# Patient Record
Sex: MALE | Race: WHITE | HISPANIC OR LATINO | Employment: FULL TIME | URBAN - METROPOLITAN AREA
[De-identification: names, ages, dates, MRNs, and addresses within clinical notes are randomized per-mention and may not be internally consistent; named-entity substitution may affect disease eponyms.]

---

## 2023-09-12 ENCOUNTER — LAB (OUTPATIENT)
Dept: LAB | Facility: LAB | Age: 53
End: 2023-09-12
Payer: OTHER GOVERNMENT

## 2023-09-12 LAB
ACTIVATED PARTIAL THROMBOPLASTIN TIME IN PPP BY COAGULATION ASSAY: 30 SEC (ref 27–38)
ALANINE AMINOTRANSFERASE (SGPT) (U/L) IN SER/PLAS: NORMAL
ALBUMIN (G/DL) IN SER/PLAS: NORMAL
ALKALINE PHOSPHATASE (U/L) IN SER/PLAS: NORMAL
AMYLASE (U/L) IN SER/PLAS: NORMAL
APPEARANCE, URINE: CLEAR
ASPARTATE AMINOTRANSFERASE (SGOT) (U/L) IN SER/PLAS: NORMAL
BILIRUBIN DIRECT (MG/DL) IN SER/PLAS: NORMAL
BILIRUBIN TOTAL (MG/DL) IN SER/PLAS: NORMAL
BILIRUBIN, URINE: NEGATIVE
BLOOD, URINE: ABNORMAL
C PEPTIDE (NG/ML) IN SER/PLAS: 3.7 NG/ML (ref 0.7–3.9)
COLOR, URINE: ABNORMAL
CREATININE (MG/DL) IN SER/PLAS: NORMAL
CYTOMEGALOVIRUS IGG ANTIBODY: REACTIVE
EBV INTERPRETATION: ABNORMAL
EPSTEIN-BARR VCA IGG: POSITIVE
EPSTEIN-BARR VCA IGM: NEGATIVE
EPSTEIN-BARR VIRUS EARLY ANTIGEN ANTIBODY, IGG: POSITIVE
EPSTIEN-BARR NUCLEAR ANTIGEN AB: POSITIVE
ERYTHROCYTE DISTRIBUTION WIDTH (RATIO) BY AUTOMATED COUNT: 13.2 % (ref 11.5–14.5)
ERYTHROCYTE MEAN CORPUSCULAR HEMOGLOBIN CONCENTRATION (G/DL) BY AUTOMATED: 34.9 G/DL (ref 32–36)
ERYTHROCYTE MEAN CORPUSCULAR VOLUME (FL) BY AUTOMATED COUNT: 93 FL (ref 80–100)
ERYTHROCYTES (10*6/UL) IN BLOOD BY AUTOMATED COUNT: 4.93 X10E12/L (ref 4.5–5.9)
ESTIMATED AVERAGE GLUCOSE FOR HBA1C: 214 MG/DL
FLOW AUTOCROSSMATCH: NORMAL
GFR FEMALE: NORMAL
GFR MALE: NORMAL
GLUCOSE, URINE: ABNORMAL MG/DL
HEMATOCRIT (%) IN BLOOD BY AUTOMATED COUNT: 45.9 % (ref 41–52)
HEMOGLOBIN (G/DL) IN BLOOD: 16 G/DL (ref 13.5–17.5)
HEMOGLOBIN A1C/HEMOGLOBIN TOTAL IN BLOOD: 9.1 %
HEPATITIS B VIRUS CORE AB (PRESENCE) IN SER/PLAS BY IMM: NONREACTIVE
HEPATITIS B VIRUS SURFACE AB (MIU/ML) IN SERUM: 86.5 MIU/ML
HEPATITIS B VIRUS SURFACE AG PRESENCE IN SERUM: NONREACTIVE
HEPATITIS C VIRUS AB PRESENCE IN SERUM: NONREACTIVE
HIV 1/ 2 AG/AB SCREEN: NONREACTIVE
HLA CLASS I ANTIBODY SCREEN, FLOW CYTOMETRY: NORMAL
HLA-A LOCUS LOW RESOLUTION TYPING: NORMAL
HLA-B LOCUS LOW RESOLUTION TYPING: NORMAL
HLA-C LOCUS LOW RESOLUTION TYPING: NORMAL
HLA-DPB1 HIGH RESOLUTION TYPING: NORMAL
HLA-DQB1 HIGH RESOLUTION TYPING: NORMAL
HLA-DR1/3/4/5 + DQB1 LOW RES TYPING: NORMAL
INR IN PPP BY COAGULATION ASSAY: 0.9 (ref 0.9–1.1)
KETONES, URINE: NEGATIVE MG/DL
LEUKOCYTE ESTERASE, URINE: NEGATIVE
LEUKOCYTES (10*3/UL) IN BLOOD BY AUTOMATED COUNT: 10 X10E9/L (ref 4.4–11.3)
NITRITE, URINE: NEGATIVE
NRBC (PER 100 WBCS) BY AUTOMATED COUNT: 0 /100 WBC (ref 0–0)
PH, URINE: 6 (ref 5–8)
PHOSPHATE (MG/DL) IN SER/PLAS: NORMAL
PLATELETS (10*3/UL) IN BLOOD AUTOMATED COUNT: 250 X10E9/L (ref 150–450)
PROSTATE SPECIFIC AG (NG/ML) IN SER/PLAS: 0.38 NG/ML (ref 0–4)
PROTEIN TOTAL: NORMAL
PROTEIN, URINE: ABNORMAL MG/DL
PROTHROMBIN TIME (PT) IN PPP BY COAGULATION ASSAY: 10.2 SEC (ref 9.8–12.8)
RBC, URINE: NORMAL /HPF (ref 0–5)
SPECIFIC GRAVITY, URINE: 1.02 (ref 1–1.03)
SYPHILIS TOTAL AB: NONREACTIVE
UREA NITROGEN (MG/DL) IN SER/PLAS: NORMAL
UROBILINOGEN, URINE: <2 MG/DL (ref 0–1.9)
VARICELLA ZOSTER IGG: POSITIVE
WBC, URINE: NORMAL /HPF (ref 0–5)

## 2023-09-14 LAB
ABO GROUP (TYPE) IN BLOOD: NORMAL
NIL(NEG) CONTROL SPOT COUNT: NORMAL
PANEL A SPOT COUNT: 0
PANEL B SPOT COUNT: 0
POS CONTROL SPOT COUNT: NORMAL
RH FACTOR: NORMAL
T-SPOT. TB INTERPRETATION: NEGATIVE

## 2023-09-15 LAB
AMPHETAMINE SCREEN BLOOD: NEGATIVE NG/ML
BARBITURATE SCREEN BLOOD: NEGATIVE NG/ML
BENZODIAZEPINES SCREEN BLOOD: NEGATIVE NG/ML
BUPRENORPHINE SCREEN BLOOD: NEGATIVE NG/ML
CANNABINOIDS SCREEN BLOOD: NEGATIVE NG/ML
COCAINE SCREEN BLOOD: NEGATIVE NG/ML
DRUG SCREEN COMMENT BLOOD: NORMAL
METHADONE SCREEN BLOOD: NEGATIVE NG/ML
METHAMPHETAMINE, BLOOD, SCREEN: NEGATIVE NG/ML
OPIATE SCREEN BLOOD: NEGATIVE NG/ML
OXYCODONE SCREEN BLOOD: NEGATIVE NG/ML
PHENCYCLIDINE SCREEN BLOOD: NEGATIVE NG/ML

## 2023-09-18 LAB
COTININE BLOOD QUANTITATIVE: <5 NG/ML
NICOTINE BLOOD QUANTITATIVE: <5 NG/ML

## 2023-11-30 ENCOUNTER — LAB REQUISITION (OUTPATIENT)
Dept: LAB | Facility: CLINIC | Age: 53
End: 2023-11-30
Payer: OTHER GOVERNMENT

## 2023-11-30 DIAGNOSIS — N18.6 END STAGE RENAL DISEASE (MULTI): ICD-10-CM

## 2023-11-30 PROCEDURE — 80504 PATH CLIN CONSLTJ MOD 21-40: CPT | Performed by: INTERNAL MEDICINE

## 2023-12-04 ENCOUNTER — LAB REQUISITION (OUTPATIENT)
Dept: LAB | Facility: CLINIC | Age: 53
End: 2023-12-04

## 2023-12-04 DIAGNOSIS — N18.6 END STAGE RENAL DISEASE (MULTI): ICD-10-CM

## 2023-12-04 LAB
HLA CLS I TYP PNL BLD/T DONR HIGH RES: NORMAL
HLA RESULTS: NORMAL
HLA-DP2 QL: NORMAL
HLA-DQB1 HIGH RES: NORMAL
HLA-DRB1 HIGH RES: NORMAL

## 2023-12-16 PROCEDURE — 80504 PATH CLIN CONSLTJ MOD 21-40: CPT | Performed by: TRANSPLANT SURGERY

## 2023-12-19 ENCOUNTER — LAB REQUISITION (OUTPATIENT)
Dept: LAB | Facility: CLINIC | Age: 53
End: 2023-12-19

## 2023-12-19 DIAGNOSIS — N18.6 END STAGE RENAL DISEASE (MULTI): ICD-10-CM

## 2023-12-27 ENCOUNTER — LAB REQUISITION (OUTPATIENT)
Dept: LAB | Facility: CLINIC | Age: 53
End: 2023-12-27
Payer: OTHER GOVERNMENT

## 2023-12-27 DIAGNOSIS — N18.6 END STAGE RENAL DISEASE (MULTI): ICD-10-CM

## 2024-01-10 ENCOUNTER — APPOINTMENT (OUTPATIENT)
Dept: CARDIOLOGY | Facility: HOSPITAL | Age: 54
DRG: 391 | End: 2024-01-10
Payer: OTHER GOVERNMENT

## 2024-01-10 ENCOUNTER — APPOINTMENT (OUTPATIENT)
Dept: RADIOLOGY | Facility: HOSPITAL | Age: 54
DRG: 391 | End: 2024-01-10
Payer: OTHER GOVERNMENT

## 2024-01-10 ENCOUNTER — HOSPITAL ENCOUNTER (INPATIENT)
Facility: HOSPITAL | Age: 54
LOS: 1 days | Discharge: HOME | DRG: 391 | End: 2024-01-11
Attending: EMERGENCY MEDICINE | Admitting: STUDENT IN AN ORGANIZED HEALTH CARE EDUCATION/TRAINING PROGRAM
Payer: OTHER GOVERNMENT

## 2024-01-10 DIAGNOSIS — N18.6 ESRD ON DIALYSIS (MULTI): Primary | ICD-10-CM

## 2024-01-10 DIAGNOSIS — R10.10 PAIN OF UPPER ABDOMEN: ICD-10-CM

## 2024-01-10 DIAGNOSIS — R11.2 NAUSEA AND VOMITING, UNSPECIFIED VOMITING TYPE: ICD-10-CM

## 2024-01-10 DIAGNOSIS — Z99.2 ESRD ON DIALYSIS (MULTI): Primary | ICD-10-CM

## 2024-01-10 PROBLEM — I10 PRIMARY HYPERTENSION: Status: ACTIVE | Noted: 2024-01-10

## 2024-01-10 PROBLEM — E78.49 OTHER HYPERLIPIDEMIA: Status: ACTIVE | Noted: 2024-01-10

## 2024-01-10 PROBLEM — E11.8 CONTROLLED TYPE 2 DIABETES MELLITUS WITH COMPLICATION, WITH LONG-TERM CURRENT USE OF INSULIN (MULTI): Status: ACTIVE | Noted: 2024-01-10

## 2024-01-10 PROBLEM — Z79.4 CONTROLLED TYPE 2 DIABETES MELLITUS WITH COMPLICATION, WITH LONG-TERM CURRENT USE OF INSULIN (MULTI): Status: ACTIVE | Noted: 2024-01-10

## 2024-01-10 LAB
ALBUMIN SERPL BCP-MCNC: 4.3 G/DL (ref 3.4–5)
ALP SERPL-CCNC: 74 U/L (ref 33–120)
ALT SERPL W P-5'-P-CCNC: 20 U/L (ref 10–52)
AMPHETAMINES UR QL SCN: NORMAL
ANION GAP SERPL CALC-SCNC: 20 MMOL/L (ref 10–20)
APPEARANCE UR: CLEAR
AST SERPL W P-5'-P-CCNC: 30 U/L (ref 9–39)
BARBITURATES UR QL SCN: NORMAL
BENZODIAZ UR QL SCN: NORMAL
BILIRUB SERPL-MCNC: 0.6 MG/DL (ref 0–1.2)
BILIRUB UR STRIP.AUTO-MCNC: NEGATIVE MG/DL
BUN SERPL-MCNC: 77 MG/DL (ref 6–23)
BZE UR QL SCN: NORMAL
CALCIUM SERPL-MCNC: 9.2 MG/DL (ref 8.6–10.3)
CANNABINOIDS UR QL SCN: NORMAL
CARDIAC TROPONIN I PNL SERPL HS: 10 NG/L (ref 0–20)
CHLORIDE SERPL-SCNC: 100 MMOL/L (ref 98–107)
CO2 SERPL-SCNC: 23 MMOL/L (ref 21–32)
COLOR UR: YELLOW
CREAT SERPL-MCNC: 9.6 MG/DL (ref 0.5–1.3)
EGFRCR SERPLBLD CKD-EPI 2021: 6 ML/MIN/1.73M*2
ERYTHROCYTE [DISTWIDTH] IN BLOOD BY AUTOMATED COUNT: 12.8 % (ref 11.5–14.5)
FENTANYL+NORFENTANYL UR QL SCN: NORMAL
GLUCOSE BLD MANUAL STRIP-MCNC: 149 MG/DL (ref 74–99)
GLUCOSE SERPL-MCNC: 164 MG/DL (ref 74–99)
GLUCOSE UR STRIP.AUTO-MCNC: ABNORMAL MG/DL
HCT VFR BLD AUTO: 38.1 % (ref 41–52)
HGB BLD-MCNC: 12.7 G/DL (ref 13.5–17.5)
HOLD SPECIMEN: NORMAL
KETONES UR STRIP.AUTO-MCNC: ABNORMAL MG/DL
LACTATE SERPL-SCNC: 2.3 MMOL/L (ref 0.4–2)
LACTATE SERPL-SCNC: 2.8 MMOL/L (ref 0.4–2)
LEUKOCYTE ESTERASE UR QL STRIP.AUTO: NEGATIVE
LIPASE SERPL-CCNC: 49 U/L (ref 9–82)
MCH RBC QN AUTO: 30.6 PG (ref 26–34)
MCHC RBC AUTO-ENTMCNC: 33.3 G/DL (ref 32–36)
MCV RBC AUTO: 92 FL (ref 80–100)
NITRITE UR QL STRIP.AUTO: NEGATIVE
NRBC BLD-RTO: 0 /100 WBCS (ref 0–0)
OPIATES UR QL SCN: NORMAL
OXYCODONE+OXYMORPHONE UR QL SCN: NORMAL
PCP UR QL SCN: NORMAL
PH UR STRIP.AUTO: 8 [PH]
PLATELET # BLD AUTO: 245 X10*3/UL (ref 150–450)
POTASSIUM SERPL-SCNC: 5.2 MMOL/L (ref 3.5–5.3)
PROT SERPL-MCNC: 7.2 G/DL (ref 6.4–8.2)
PROT UR STRIP.AUTO-MCNC: ABNORMAL MG/DL
RBC # BLD AUTO: 4.15 X10*6/UL (ref 4.5–5.9)
RBC # UR STRIP.AUTO: NEGATIVE /UL
RBC #/AREA URNS AUTO: NORMAL /HPF
SODIUM SERPL-SCNC: 138 MMOL/L (ref 136–145)
SP GR UR STRIP.AUTO: 1.01
UROBILINOGEN UR STRIP.AUTO-MCNC: <2 MG/DL
WBC # BLD AUTO: 13.3 X10*3/UL (ref 4.4–11.3)
WBC #/AREA URNS AUTO: NORMAL /HPF

## 2024-01-10 PROCEDURE — 99285 EMERGENCY DEPT VISIT HI MDM: CPT | Performed by: EMERGENCY MEDICINE

## 2024-01-10 PROCEDURE — 36415 COLL VENOUS BLD VENIPUNCTURE: CPT | Performed by: STUDENT IN AN ORGANIZED HEALTH CARE EDUCATION/TRAINING PROGRAM

## 2024-01-10 PROCEDURE — 83605 ASSAY OF LACTIC ACID: CPT | Performed by: STUDENT IN AN ORGANIZED HEALTH CARE EDUCATION/TRAINING PROGRAM

## 2024-01-10 PROCEDURE — 80307 DRUG TEST PRSMV CHEM ANLYZR: CPT | Performed by: STUDENT IN AN ORGANIZED HEALTH CARE EDUCATION/TRAINING PROGRAM

## 2024-01-10 PROCEDURE — 96376 TX/PRO/DX INJ SAME DRUG ADON: CPT

## 2024-01-10 PROCEDURE — 2500000004 HC RX 250 GENERAL PHARMACY W/ HCPCS (ALT 636 FOR OP/ED): Performed by: INTERNAL MEDICINE

## 2024-01-10 PROCEDURE — 2500000004 HC RX 250 GENERAL PHARMACY W/ HCPCS (ALT 636 FOR OP/ED): Performed by: STUDENT IN AN ORGANIZED HEALTH CARE EDUCATION/TRAINING PROGRAM

## 2024-01-10 PROCEDURE — 82947 ASSAY GLUCOSE BLOOD QUANT: CPT

## 2024-01-10 PROCEDURE — 83690 ASSAY OF LIPASE: CPT | Performed by: STUDENT IN AN ORGANIZED HEALTH CARE EDUCATION/TRAINING PROGRAM

## 2024-01-10 PROCEDURE — 99222 1ST HOSP IP/OBS MODERATE 55: CPT | Performed by: STUDENT IN AN ORGANIZED HEALTH CARE EDUCATION/TRAINING PROGRAM

## 2024-01-10 PROCEDURE — 84484 ASSAY OF TROPONIN QUANT: CPT | Performed by: STUDENT IN AN ORGANIZED HEALTH CARE EDUCATION/TRAINING PROGRAM

## 2024-01-10 PROCEDURE — 74177 CT ABD & PELVIS W/CONTRAST: CPT

## 2024-01-10 PROCEDURE — 93010 ELECTROCARDIOGRAM REPORT: CPT | Performed by: INTERNAL MEDICINE

## 2024-01-10 PROCEDURE — 71045 X-RAY EXAM CHEST 1 VIEW: CPT | Performed by: RADIOLOGY

## 2024-01-10 PROCEDURE — 84075 ASSAY ALKALINE PHOSPHATASE: CPT | Performed by: STUDENT IN AN ORGANIZED HEALTH CARE EDUCATION/TRAINING PROGRAM

## 2024-01-10 PROCEDURE — 94760 N-INVAS EAR/PLS OXIMETRY 1: CPT

## 2024-01-10 PROCEDURE — 71045 X-RAY EXAM CHEST 1 VIEW: CPT

## 2024-01-10 PROCEDURE — 85027 COMPLETE CBC AUTOMATED: CPT | Performed by: STUDENT IN AN ORGANIZED HEALTH CARE EDUCATION/TRAINING PROGRAM

## 2024-01-10 PROCEDURE — 96374 THER/PROPH/DIAG INJ IV PUSH: CPT

## 2024-01-10 PROCEDURE — 2550000001 HC RX 255 CONTRASTS: Performed by: EMERGENCY MEDICINE

## 2024-01-10 PROCEDURE — 74177 CT ABD & PELVIS W/CONTRAST: CPT | Performed by: RADIOLOGY

## 2024-01-10 PROCEDURE — 2500000004 HC RX 250 GENERAL PHARMACY W/ HCPCS (ALT 636 FOR OP/ED)

## 2024-01-10 PROCEDURE — 96375 TX/PRO/DX INJ NEW DRUG ADDON: CPT

## 2024-01-10 PROCEDURE — 1200000002 HC GENERAL ROOM WITH TELEMETRY DAILY

## 2024-01-10 PROCEDURE — 93005 ELECTROCARDIOGRAM TRACING: CPT

## 2024-01-10 PROCEDURE — 81001 URINALYSIS AUTO W/SCOPE: CPT

## 2024-01-10 PROCEDURE — 93010 ELECTROCARDIOGRAM REPORT: CPT | Performed by: EMERGENCY MEDICINE

## 2024-01-10 RX ORDER — AMLODIPINE BESYLATE 10 MG/1
10 TABLET ORAL
COMMUNITY

## 2024-01-10 RX ORDER — LORAZEPAM 0.5 MG/1
0.5 TABLET ORAL NIGHTLY PRN
COMMUNITY

## 2024-01-10 RX ORDER — ONDANSETRON HYDROCHLORIDE 2 MG/ML
4 INJECTION, SOLUTION INTRAVENOUS ONCE
Status: COMPLETED | OUTPATIENT
Start: 2024-01-10 | End: 2024-01-10

## 2024-01-10 RX ORDER — INSULIN GLARGINE-YFGN 100 [IU]/ML
35 INJECTION, SOLUTION SUBCUTANEOUS NIGHTLY
COMMUNITY
Start: 2023-08-04

## 2024-01-10 RX ORDER — INSULIN GLARGINE 100 [IU]/ML
20 INJECTION, SOLUTION SUBCUTANEOUS
COMMUNITY
End: 2024-01-10 | Stop reason: ENTERED-IN-ERROR

## 2024-01-10 RX ORDER — FOLIC ACID/VIT B COMPLEX AND C 0.8 MG
0.8 TABLET ORAL DAILY
COMMUNITY

## 2024-01-10 RX ORDER — DEXLANSOPRAZOLE 60 MG/1
CAPSULE, DELAYED RELEASE ORAL
COMMUNITY
Start: 2012-05-14 | End: 2024-01-10 | Stop reason: ENTERED-IN-ERROR

## 2024-01-10 RX ORDER — HEPARIN SODIUM 5000 [USP'U]/ML
3000 INJECTION, SOLUTION INTRAVENOUS; SUBCUTANEOUS ONCE
Status: DISCONTINUED | OUTPATIENT
Start: 2024-01-10 | End: 2024-01-11 | Stop reason: HOSPADM

## 2024-01-10 RX ORDER — POLYETHYLENE GLYCOL 3350 17 G/17G
17 POWDER, FOR SOLUTION ORAL DAILY
COMMUNITY

## 2024-01-10 RX ORDER — INSULIN ASPART 100 [IU]/ML
1-5 INJECTION, SOLUTION INTRAVENOUS; SUBCUTANEOUS
COMMUNITY
Start: 2023-08-07 | End: 2024-08-07

## 2024-01-10 RX ORDER — TALC
6 POWDER (GRAM) TOPICAL NIGHTLY
COMMUNITY

## 2024-01-10 RX ORDER — FUROSEMIDE 40 MG/1
40 TABLET ORAL DAILY
COMMUNITY

## 2024-01-10 RX ORDER — SUCROFERRIC OXYHYDROXIDE 500 MG/1
1000 TABLET, CHEWABLE ORAL
COMMUNITY

## 2024-01-10 RX ORDER — CETIRIZINE HYDROCHLORIDE 5 MG/1
5 TABLET ORAL DAILY
COMMUNITY

## 2024-01-10 RX ORDER — HEPARIN SODIUM 5000 [USP'U]/ML
5000 INJECTION, SOLUTION INTRAVENOUS; SUBCUTANEOUS EVERY 8 HOURS SCHEDULED
Status: DISCONTINUED | OUTPATIENT
Start: 2024-01-10 | End: 2024-01-11 | Stop reason: HOSPADM

## 2024-01-10 RX ORDER — PROPRANOLOL HYDROCHLORIDE 160 MG/1
160 CAPSULE, EXTENDED RELEASE ORAL DAILY
COMMUNITY
Start: 2019-10-16 | End: 2024-11-07

## 2024-01-10 RX ORDER — MIRTAZAPINE 15 MG/1
7.5 TABLET, FILM COATED ORAL NIGHTLY
COMMUNITY

## 2024-01-10 RX ORDER — INSULIN LISPRO 100 [IU]/ML
0-10 INJECTION, SOLUTION INTRAVENOUS; SUBCUTANEOUS
Status: DISCONTINUED | OUTPATIENT
Start: 2024-01-10 | End: 2024-01-11 | Stop reason: HOSPADM

## 2024-01-10 RX ORDER — ALBUMIN HUMAN 250 G/1000ML
12.5 SOLUTION INTRAVENOUS AS NEEDED
Status: DISCONTINUED | OUTPATIENT
Start: 2024-01-10 | End: 2024-01-11 | Stop reason: HOSPADM

## 2024-01-10 RX ORDER — DROPERIDOL 2.5 MG/ML
1.25 INJECTION, SOLUTION INTRAMUSCULAR; INTRAVENOUS ONCE
Status: COMPLETED | OUTPATIENT
Start: 2024-01-10 | End: 2024-01-10

## 2024-01-10 RX ORDER — AMOXICILLIN 250 MG
2 CAPSULE ORAL DAILY
COMMUNITY

## 2024-01-10 RX ORDER — ONDANSETRON HYDROCHLORIDE 2 MG/ML
4 INJECTION, SOLUTION INTRAVENOUS EVERY 6 HOURS PRN
Status: DISCONTINUED | OUTPATIENT
Start: 2024-01-10 | End: 2024-01-11 | Stop reason: HOSPADM

## 2024-01-10 RX ORDER — PROCHLORPERAZINE EDISYLATE 5 MG/ML
10 INJECTION INTRAMUSCULAR; INTRAVENOUS EVERY 6 HOURS PRN
Status: DISCONTINUED | OUTPATIENT
Start: 2024-01-10 | End: 2024-01-11 | Stop reason: HOSPADM

## 2024-01-10 RX ORDER — LEVOTHYROXINE SODIUM 175 UG/1
175 TABLET ORAL DAILY
COMMUNITY
Start: 2021-10-20

## 2024-01-10 RX ORDER — FAMOTIDINE 20 MG/1
20 TABLET, FILM COATED ORAL
COMMUNITY
Start: 2020-06-17

## 2024-01-10 RX ORDER — ROPINIROLE 0.5 MG/1
0.5 TABLET, FILM COATED ORAL NIGHTLY
COMMUNITY

## 2024-01-10 RX ORDER — DEXTROSE 50 % IN WATER (D50W) INTRAVENOUS SYRINGE
25
Status: DISCONTINUED | OUTPATIENT
Start: 2024-01-10 | End: 2024-01-11 | Stop reason: HOSPADM

## 2024-01-10 RX ORDER — SERTRALINE HYDROCHLORIDE 100 MG/1
150 TABLET, FILM COATED ORAL DAILY
COMMUNITY

## 2024-01-10 RX ORDER — ATORVASTATIN CALCIUM 10 MG/1
10 TABLET, FILM COATED ORAL
COMMUNITY
Start: 2023-08-04

## 2024-01-10 RX ORDER — HYDROXYZINE PAMOATE 50 MG/1
50 CAPSULE ORAL NIGHTLY PRN
COMMUNITY
Start: 2023-08-08 | End: 2024-08-08

## 2024-01-10 RX ORDER — LOSARTAN POTASSIUM 100 MG/1
1 TABLET ORAL DAILY
COMMUNITY
End: 2024-01-10 | Stop reason: ENTERED-IN-ERROR

## 2024-01-10 RX ORDER — LISINOPRIL 20 MG/1
TABLET ORAL
COMMUNITY
End: 2024-01-10 | Stop reason: ENTERED-IN-ERROR

## 2024-01-10 RX ORDER — HEPARIN SODIUM 1000 [USP'U]/ML
3000 INJECTION, SOLUTION INTRAVENOUS; SUBCUTANEOUS
Status: DISCONTINUED | OUTPATIENT
Start: 2024-01-10 | End: 2024-01-11 | Stop reason: HOSPADM

## 2024-01-10 RX ORDER — AMITRIPTYLINE HYDROCHLORIDE 25 MG/1
TABLET, FILM COATED ORAL
COMMUNITY
End: 2024-01-10 | Stop reason: ENTERED-IN-ERROR

## 2024-01-10 RX ORDER — DEXTROSE MONOHYDRATE 100 MG/ML
0.3 INJECTION, SOLUTION INTRAVENOUS ONCE AS NEEDED
Status: DISCONTINUED | OUTPATIENT
Start: 2024-01-10 | End: 2024-01-11 | Stop reason: HOSPADM

## 2024-01-10 RX ORDER — CHOLECALCIFEROL (VITAMIN D3) 50 MCG
50 TABLET ORAL DAILY
COMMUNITY

## 2024-01-10 RX ADMIN — DROPERIDOL 1.25 MG: 2.5 INJECTION, SOLUTION INTRAMUSCULAR; INTRAVENOUS at 17:33

## 2024-01-10 RX ADMIN — PROCHLORPERAZINE EDISYLATE 10 MG: 5 INJECTION INTRAMUSCULAR; INTRAVENOUS at 22:30

## 2024-01-10 RX ADMIN — SODIUM CHLORIDE 1000 ML: 9 INJECTION, SOLUTION INTRAVENOUS at 13:42

## 2024-01-10 RX ADMIN — HEPARIN SODIUM 3000 UNITS: 1000 INJECTION, SOLUTION INTRAVENOUS; SUBCUTANEOUS at 15:25

## 2024-01-10 RX ADMIN — HEPARIN SODIUM 5000 UNITS: 5000 INJECTION INTRAVENOUS; SUBCUTANEOUS at 21:04

## 2024-01-10 RX ADMIN — IOHEXOL 75 ML: 350 INJECTION, SOLUTION INTRAVENOUS at 15:11

## 2024-01-10 RX ADMIN — HYDROMORPHONE HYDROCHLORIDE 0.2 MG: 0.2 INJECTION, SOLUTION INTRAMUSCULAR; INTRAVENOUS; SUBCUTANEOUS at 22:34

## 2024-01-10 RX ADMIN — HYDROMORPHONE HYDROCHLORIDE 0.5 MG: 1 INJECTION, SOLUTION INTRAMUSCULAR; INTRAVENOUS; SUBCUTANEOUS at 14:17

## 2024-01-10 RX ADMIN — ONDANSETRON 4 MG: 2 INJECTION INTRAMUSCULAR; INTRAVENOUS at 13:13

## 2024-01-10 RX ADMIN — ONDANSETRON 4 MG: 2 INJECTION INTRAMUSCULAR; INTRAVENOUS at 20:50

## 2024-01-10 RX ADMIN — ONDANSETRON 4 MG: 2 INJECTION INTRAMUSCULAR; INTRAVENOUS at 14:17

## 2024-01-10 SDOH — SOCIAL STABILITY: SOCIAL INSECURITY: DO YOU FEEL ANYONE HAS EXPLOITED OR TAKEN ADVANTAGE OF YOU FINANCIALLY OR OF YOUR PERSONAL PROPERTY?: NO

## 2024-01-10 SDOH — SOCIAL STABILITY: SOCIAL INSECURITY: WERE YOU ABLE TO COMPLETE ALL THE BEHAVIORAL HEALTH SCREENINGS?: YES

## 2024-01-10 SDOH — SOCIAL STABILITY: SOCIAL INSECURITY: ARE THERE ANY APPARENT SIGNS OF INJURIES/BEHAVIORS THAT COULD BE RELATED TO ABUSE/NEGLECT?: NO

## 2024-01-10 SDOH — SOCIAL STABILITY: SOCIAL INSECURITY: ARE YOU OR HAVE YOU BEEN THREATENED OR ABUSED PHYSICALLY, EMOTIONALLY, OR SEXUALLY BY ANYONE?: NO

## 2024-01-10 SDOH — SOCIAL STABILITY: SOCIAL INSECURITY: ABUSE: ADULT

## 2024-01-10 SDOH — SOCIAL STABILITY: SOCIAL INSECURITY: DOES ANYONE TRY TO KEEP YOU FROM HAVING/CONTACTING OTHER FRIENDS OR DOING THINGS OUTSIDE YOUR HOME?: NO

## 2024-01-10 SDOH — SOCIAL STABILITY: SOCIAL INSECURITY: DO YOU FEEL UNSAFE GOING BACK TO THE PLACE WHERE YOU ARE LIVING?: NO

## 2024-01-10 SDOH — SOCIAL STABILITY: SOCIAL INSECURITY: HAS ANYONE EVER THREATENED TO HURT YOUR FAMILY OR YOUR PETS?: NO

## 2024-01-10 ASSESSMENT — PAIN - FUNCTIONAL ASSESSMENT
PAIN_FUNCTIONAL_ASSESSMENT: 0-10
PAIN_FUNCTIONAL_ASSESSMENT: 0-10

## 2024-01-10 ASSESSMENT — COGNITIVE AND FUNCTIONAL STATUS - GENERAL
PATIENT BASELINE BEDBOUND: NO
MOBILITY SCORE: 24
DAILY ACTIVITIY SCORE: 24
MOBILITY SCORE: 24
DAILY ACTIVITIY SCORE: 24

## 2024-01-10 ASSESSMENT — ACTIVITIES OF DAILY LIVING (ADL)
WALKS IN HOME: INDEPENDENT
JUDGMENT_ADEQUATE_SAFELY_COMPLETE_DAILY_ACTIVITIES: YES
HEARING - RIGHT EAR: FUNCTIONAL
FEEDING YOURSELF: INDEPENDENT
ADEQUATE_TO_COMPLETE_ADL: YES
HEARING - LEFT EAR: FUNCTIONAL
TOILETING: INDEPENDENT
DRESSING YOURSELF: INDEPENDENT
BATHING: INDEPENDENT
LACK_OF_TRANSPORTATION: NO
GROOMING: INDEPENDENT
PATIENT'S MEMORY ADEQUATE TO SAFELY COMPLETE DAILY ACTIVITIES?: YES

## 2024-01-10 ASSESSMENT — PAIN DESCRIPTION - PAIN TYPE: TYPE: ACUTE PAIN

## 2024-01-10 ASSESSMENT — LIFESTYLE VARIABLES
EVER FELT BAD OR GUILTY ABOUT YOUR DRINKING: NO
HAVE PEOPLE ANNOYED YOU BY CRITICIZING YOUR DRINKING: NO
REASON UNABLE TO ASSESS: NO
EVER HAD A DRINK FIRST THING IN THE MORNING TO STEADY YOUR NERVES TO GET RID OF A HANGOVER: NO
HAVE YOU EVER FELT YOU SHOULD CUT DOWN ON YOUR DRINKING: NO
HAVE YOU EVER FELT YOU SHOULD CUT DOWN ON YOUR DRINKING: NO
EVER FELT BAD OR GUILTY ABOUT YOUR DRINKING: NO
REASON UNABLE TO ASSESS: NO
HAVE PEOPLE ANNOYED YOU BY CRITICIZING YOUR DRINKING: NO

## 2024-01-10 ASSESSMENT — ENCOUNTER SYMPTOMS
ARTHRALGIAS: 0
WEAKNESS: 1
ADENOPATHY: 0
DIFFICULTY URINATING: 0
EYE DISCHARGE: 0
AGITATION: 0
VOMITING: 1
ACTIVITY CHANGE: 0
SHORTNESS OF BREATH: 0

## 2024-01-10 ASSESSMENT — COLUMBIA-SUICIDE SEVERITY RATING SCALE - C-SSRS
2. HAVE YOU ACTUALLY HAD ANY THOUGHTS OF KILLING YOURSELF?: NO
1. IN THE PAST MONTH, HAVE YOU WISHED YOU WERE DEAD OR WISHED YOU COULD GO TO SLEEP AND NOT WAKE UP?: NO
6. HAVE YOU EVER DONE ANYTHING, STARTED TO DO ANYTHING, OR PREPARED TO DO ANYTHING TO END YOUR LIFE?: NO

## 2024-01-10 ASSESSMENT — PAIN DESCRIPTION - LOCATION
LOCATION: ABDOMEN
LOCATION: ABDOMEN

## 2024-01-10 ASSESSMENT — PAIN DESCRIPTION - ORIENTATION: ORIENTATION: LEFT;RIGHT

## 2024-01-10 ASSESSMENT — PAIN SCALES - GENERAL
PAINLEVEL_OUTOF10: 8

## 2024-01-10 NOTE — H&P
History Of Present Illness  Duane Adams is a 53 y.o. male presenting with nausea and vomiting.  [facility] is [facility] with a past medical history significant for ESRD on hemodialysis Monday Wednesday Friday, type 2 diabetes, thyroid cancer status post resection.  Patient states on Monday, he was not feeling well therefore he missed his dialysis session.  Since then he has been getting nauseous and feeling worse.  Patient does endorse history of cyclic vomiting syndrome.  Patient also has scheduled dialysis today, but felt too nauseous to go therefore prompted to the ER for further evaluation.  Denies any fever, chills, shortness of breath, chest pain, diarrhea.  He does endorse mild epigastric pain.    While in the ER, his blood pressure was elevated likely due to persistent nausea and vomiting. BMP showed elevated creatinine and BUN, this is from his ESRD.  Lactate was 2.8 likely due to multiple episodes of nausea and vomiting.  WBC 13.3, which could be reactive.  Hemoglobin 12.7.  Chest x-ray showed central pulmonary vascular congestion.  CT abdomen was done showed bilateral symmetrical renal cortical atrophy without any hydronephrosis.  Possible cystitis.  Case was discussed with patient's primary nephrologist, patient will be admitted for dialysis and further evaluation.    Per ER report, [facility] but [facility], patient will be admitted here for treatment.     Past Medical History  He has a past medical history of Encounter for other preprocedural examination (11/03/2016), Personal history of malignant neoplasm of thyroid, Personal history of other diseases of the circulatory system, and Personal history of other endocrine, nutritional and metabolic disease.    Surgical History  He has a past surgical history that includes Other surgical history (12/06/2018); Other surgical history (12/06/2018); and Other surgical history (12/06/2018).     Social History  He reports that he has never smoked.  "He has never used smokeless tobacco. He reports current alcohol use. He reports that he does not currently use drugs.    Family History  No family history on file.     Allergies  Aspirin, Nsaids (non-steroidal anti-inflammatory drug), Simvastatin, Topiramate, Acetaminophen, Penicillins, and Omeprazole    Review of Systems   ROS: 12 systems reviewed and negative except per HPI above     Physical Exam by System:     Constitutional: Well developed, awake/alert/oriented x3, mild distress from nausea/vomiting   ENMT: mucous membranes dry   Head/Neck: Neck supple   Respiratory/Thorax: diminished b/l   Cardiovascular: Regular, rate and rhythm, no murmurs   Gastrointestinal: Nondistended, soft, mild epigastric tenderss   Musculoskeletal: ROM intact, no joint swelling, normal strength   Extremities: normal extremities, no cyanosis edema, contusions or wounds, no clubbing   Neurological: alert and oriented x3, intact senses, motor       Last Recorded Vitals  Blood pressure 176/88, pulse 68, temperature 36.9 °C (98.4 °F), temperature source Temporal, resp. rate 12, height 1.778 m (5' 10\"), weight 96 kg (211 lb 10.3 oz), SpO2 95 %.    Relevant Results  Results for orders placed or performed during the hospital encounter of 01/10/24 (from the past 24 hour(s))   CBC   Result Value Ref Range    WBC 13.3 (H) 4.4 - 11.3 x10*3/uL    nRBC 0.0 0.0 - 0.0 /100 WBCs    RBC 4.15 (L) 4.50 - 5.90 x10*6/uL    Hemoglobin 12.7 (L) 13.5 - 17.5 g/dL    Hematocrit 38.1 (L) 41.0 - 52.0 %    MCV 92 80 - 100 fL    MCH 30.6 26.0 - 34.0 pg    MCHC 33.3 32.0 - 36.0 g/dL    RDW 12.8 11.5 - 14.5 %    Platelets 245 150 - 450 x10*3/uL   Comprehensive metabolic panel   Result Value Ref Range    Glucose 164 (H) 74 - 99 mg/dL    Sodium 138 136 - 145 mmol/L    Potassium 5.2 3.5 - 5.3 mmol/L    Chloride 100 98 - 107 mmol/L    Bicarbonate 23 21 - 32 mmol/L    Anion Gap 20 10 - 20 mmol/L    Urea Nitrogen 77 (H) 6 - 23 mg/dL    Creatinine 9.60 (H) 0.50 - 1.30 mg/dL "    eGFR 6 (L) >60 mL/min/1.73m*2    Calcium 9.2 8.6 - 10.3 mg/dL    Albumin 4.3 3.4 - 5.0 g/dL    Alkaline Phosphatase 74 33 - 120 U/L    Total Protein 7.2 6.4 - 8.2 g/dL    AST 30 9 - 39 U/L    Bilirubin, Total 0.6 0.0 - 1.2 mg/dL    ALT 20 10 - 52 U/L   Lipase   Result Value Ref Range    Lipase 49 9 - 82 U/L   Troponin I, High Sensitivity   Result Value Ref Range    Troponin I, High Sensitivity 10 0 - 20 ng/L   POCT GLUCOSE   Result Value Ref Range    POCT Glucose 149 (H) 74 - 99 mg/dL   Lactate   Result Value Ref Range    Lactate 2.8 (H) 0.4 - 2.0 mmol/L   Lavender Top   Result Value Ref Range    Extra Tube Hold for add-ons.       Scheduled medications  droperidol, 1.25 mg, intravenous, Once      Continuous medications     PRN medications            Assessment/Plan   Principal Problem:    ESRD on dialysis (CMS/[facility])  Active Problems:    Nausea and vomiting    Controlled type 2 diabetes mellitus with complication, with long-term current use of insulin (CMS/[facility])    Primary hypertension    Other hyperlipidemia      Plan  -pt missed HD on Monday and Wednsday   -Dr. Aleman consulted, plan for HD tonight  -CT abd reviewed, no significant finding besides possible cystitis. UA pending, possible start pt on rocephine if positive.    -repeat labs in AM  -suspect patient's nausea / vomiting is due to hx of cyclic vomiting vs missing HD sessions. Will monitor and treat supportively with zosyn  -SSI coverage  -resume home meds once med rec is complete  -dvt prophylaxis on subcutaneous lovenox  -full code         I spent 55 minutes in the professional and overall care of this patient.    SIGNATURE: Arnie Don MD PATIENT NAME: Duane Adams   DATE: January 10, 2024 MRN: [id number]   TIME: 5:01 PM

## 2024-01-10 NOTE — ED PROVIDER NOTES
HPI   Chief Complaint   Patient presents with    Abdominal Pain     N/v. Constipation      Patient is a 53-year-old male with past medical history significant for ESRD on dialysis MWF, IDDM 2, thyroid cancer s/p resection, who is presenting here with nausea and vomiting.  The patient also endorses constipation, noting that he has had prior episodes of vomiting due to his constipation.  He also has a history of cyclic vomiting syndrome, however last episode of this was several years ago.  Endorses abdominal pain in a band across the upper abdomen.  Denies any blood in his emesis.  No sick contacts.  Denies any chest pain, shortness of breath, dysuria, hematuria, palpitations, or loss of consciousness.    12 point review of systems was completed and is negative unless otherwise noted as above.    PMHx: As Above  PSurgHx: Thyroidectomy, appendectomy, pyloric stenosis reversal  Allergies: Aspirin -anaphylaxis, penicillin-hives.  NSAIDs-swelling.  Simvastatin-myalgia.  Topiramate-hives.  Acetaminophen, omeprazole.  SHx: Rare alcohol use.  Denies any tobacco or recreational drug use.  Lives at home by himself.  Works at the [employer] as a nurse.      History provided by:  Patient and parent    Ochoa Coma Scale Score: 15    Patient History   Past Medical History:   Diagnosis Date    Encounter for other preprocedural examination 11/03/2016    Pre-op evaluation    Personal history of malignant neoplasm of thyroid     History of malignant neoplasm of thyroid    Personal history of other diseases of the circulatory system     History of hypertension    Personal history of other endocrine, nutritional and metabolic disease     History of diabetes mellitus     Past Surgical History:   Procedure Laterality Date    OTHER SURGICAL HISTORY  12/06/2018    Thyroidectomy    OTHER SURGICAL HISTORY  12/06/2018    Nasal polypectomy    OTHER SURGICAL HISTORY  12/06/2018    Appendectomy     No family history on file.  Social History     Tobacco  Use    Smoking status: Never    Smokeless tobacco: Never   Substance Use Topics    Alcohol use: Yes    Drug use: Not Currently       Physical Exam   ED Triage Vitals [01/10/24 1252]   Temp Heart Rate Resp BP   36.9 °C (98.4 °F) 78 18 (!) 188/86      SpO2 Temp Source Heart Rate Source Patient Position   96 % Temporal Monitor Sitting      BP Location FiO2 (%)     Left arm --       Physical Exam  Constitutional:       Appearance: He is obese. He is ill-appearing.      Comments: Actively retching   Cardiovascular:      Rate and Rhythm: Normal rate and regular rhythm.      Heart sounds:      No friction rub.   Pulmonary:      Effort: No respiratory distress.      Breath sounds: No wheezing, rhonchi or rales.   Abdominal:      General: Abdomen is flat.      Tenderness: There is abdominal tenderness in the right upper quadrant and left upper quadrant.   Skin:     General: Skin is warm and dry.      Capillary Refill: Capillary refill takes 2 to 3 seconds.      Comments: Fistula with bruit on the right arm.   Neurological:      General: No focal deficit present.      Mental Status: He is alert and oriented to person, place, and time.     ED Course & MDM   ED Course as of 01/10/24 1700   Wed Josiah 10, 2024   1400 CBC, CMP, lipase, troponin, POCT glucose, lactate, EKG, CXR, CT abdomen pelvis ordered. [RT]   1400 CBC showed mild leukocytosis with WBC 13.3, and mild anemia with hemoglobin 12.7.  CMP showed BUN 77, creatinine 9.6.  Lipase WNL.  Troponin negative.  POCT glucose 149.  Lactate 2.8. [RT]   1401 EKG showed NSR with rate 72, CT interval 138, QTc 470. [RT]   1412 CXR showed central pulmonary vascular congestion. [RT]   1428 Discussed patient with his nephrologist, Dr. Aleman, agreed that patient will need dialysis and was okay for hospital admission. [RT]   1527 CT abdomen pelvis showed bilateral symmetric renal cortical atrophy.  No hydronephrosis.  Possible cystitis.  Calcification of the vas deferens which could be  associated with diabetes mellitus. [RT]   1625 Patient deemed appropriate for hospital admission due to need for dialysis, given the patient is a [facility] patient will [facility] to the [facility]. [RT]   1632 [facility] called back, no beds available at this time. Patient continuing to complain of nausea, droperidol ordered. [RT]   1653 Discussed case with general medicine, Dr. Don, who accepted the patient for hospital admission. [RT]      ED Course User Index  [RT] Garo Sauer DO         Diagnoses as of 01/10/24 1700   ESRD on dialysis (CMS/[facility])   Pain of upper abdomen   Nausea and vomiting, unspecified vomiting type     Medical Decision Making  Patient is a 53-year-old male with past medical history significant for ESRD on dialysis MWF, IDDM 2, thyroid cancer s/p resection, who is presenting here with nausea and vomiting.  The patient missed his dialysis on Monday with last dialysis done on Friday.  Differential diagnosis includes ESRD with need for dialysis, pancreatitis, colitis.  Workup included CBC, CMP, lipase, troponin, POCT glucose, lactate, EKG, CXR, CT abdomen pelvis.  CBC showed mild leukocytosis with WBC 13.3, mild anemia with hemoglobin 12.7.  CMP showed BUN 77, creatinine 9.6.  Lipase WNL.  Troponin negative.  POCT glucose 149.  Lactate 2.8.  EKG showed NSR with rate 72, MS interval 138, QTc 470.  CXR showed central pulmonary vascular congestion.  CT abdomen and pelvis showed bilateral symmetric renal cortical atrophy.  No hydronephrosis.  Possible cystitis.  Calcification of the vas deferens which could be associated with diabetes mellitus.  Discussed case with his nephrologist, Dr. Aleman, who agreed the patient will need hospital admission and dialysis.  Patient has [facility] insurance, [facility] the [facility] regarding [facility], however there were no beds available. Discussed case with general medicine, Dr. Don, who accepted the patient for hospital admission.    The assessment and plan were discussed with  the attending physician,  Garo Sauer DO PGY-1    =================Attending note===============    The patient was seen by the resident/fellow.  I have personally performed a substantive portion of the encounter.  I have seen and examined the patient; agree with the workup, evaluation, MDM,   management and diagnosis.  The care plan has been discussed with the resident; I have reviewed the resident's note and agree with the documented findings.      This is a 53 y.o. male who presents to ER with nausea and vomiting that started this morning.  He states he has vomited around 10 times.  She also had some constipation.  He had subjective fevers and chills.  Is never measured a fever.  Got some upper abdominal pain.  No chest pain or shortness of breath.  Just some diaphoresis with the vomiting.  No sick contacts.  He is a dialysis patient.  He gets dialysis on Monday Wednesday Friday.  He did not go on Monday because not feel well.  He is due for dialysis this afternoon.  Patient also has diabetes.  He had thyroid cancer that was resected.  He did have a history of cyclic vomiting in the past.  He does not use marijuana.    Heart is regular.  Lungs are clear.  Abdomen is soft.  There is some mild upper abdominal tenderness.  No guarding or rebound pain or peritoneal signs.    Lactate minimally elevated.  Troponin negative.  Lipase normal.  Chemistry does show renal failure.  He is a dialysis patient.  Normal liver function test.  White count is mildly elevated.  Hemoglobin is minimally low at 12.7.    We did discuss the case with his nephrologist, Dr. Reddy.  They were fine with the patient getting IV contrast      EKG: Normal sinus rhythm with a ventricular rate of 72.  .  QTc 470.  No ST elevation.    CT:  1.  Bilateral symmetric renal cortical atrophy. No hydronephrosis.  2. Possible cystitis. Correlate clinically.  3. Calcification of the vas deferens which could be associated with  diabetes  mellitus    Patient will need dialyzed.  Patient is admitted to hospital for further treatment and evaluation.  He is also had persistent nausea.  We did give him droperidol since he already had 2 doses of Zofran.          ==========================================          Procedure  Procedures     Garo Sauer DO  Resident  01/10/24 1702       Preet Jon DO  01/10/24 2018

## 2024-01-10 NOTE — ED PROVIDER NOTES
HPI   Chief Complaint   Patient presents with   • Abdominal Pain     N/v. Constipation      Patient is a 53-year-old male with past medical history significant for ESRD on dialysis WF, IDDM 2, thyroid cancer s/p resection, who is presenting here with nausea and vomiting.  The patient also endorses constipation, noting that he has had prior episodes of vomiting due to his constipation.  He also has a history of cyclic vomiting syndrome, however last episode of this was several years ago.  Endorses abdominal pain in a band across the upper abdomen.  Denies any blood in his emesis.  No sick contacts.  Denies any chest pain, shortness of breath, dysuria, hematuria, palpitations, or loss of consciousness.    12 point review of systems was completed and is negative unless otherwise noted as above.    PMHx: As Above  PSurgHx: Thyroidectomy, appendectomy, pyloric stenosis reversal  Allergies: Aspirin -anaphylaxis, penicillin-hives.  NSAIDs-swelling.  Simvastatin-myalgia.  Topiramate-hives.  Acetaminophen, omeprazole.  SHx: Rare alcohol use.  Denies any tobacco or recreational drug use.  Lives at home by himself.  Works at the [employer] as a nurse.      History provided by:  Patient and parent    Ochoa Coma Scale Score: 15    Patient History   Past Medical History:   Diagnosis Date   • Encounter for other preprocedural examination 11/03/2016    Pre-op evaluation   • Personal history of malignant neoplasm of thyroid     History of malignant neoplasm of thyroid   • Personal history of other diseases of the circulatory system     History of hypertension   • Personal history of other endocrine, nutritional and metabolic disease     History of diabetes mellitus     Past Surgical History:   Procedure Laterality Date   • OTHER SURGICAL HISTORY  12/06/2018    Thyroidectomy   • OTHER SURGICAL HISTORY  12/06/2018    Nasal polypectomy   • OTHER SURGICAL HISTORY  12/06/2018    Appendectomy     No family history on file.  Social History      Tobacco Use   • Smoking status: Never   • Smokeless tobacco: Never   Substance Use Topics   • Alcohol use: Yes   • Drug use: Not Currently       Physical Exam   ED Triage Vitals [01/10/24 1252]   Temp Heart Rate Resp BP   36.9 °C (98.4 °F) 78 18 (!) 188/86      SpO2 Temp Source Heart Rate Source Patient Position   96 % Temporal Monitor Sitting      BP Location FiO2 (%)     Left arm --       Physical Exam  Constitutional:       Appearance: He is obese. He is ill-appearing.      Comments: Actively retching   Cardiovascular:      Rate and Rhythm: Normal rate and regular rhythm.      Heart sounds:      No friction rub.   Pulmonary:      Effort: No respiratory distress.      Breath sounds: No wheezing, rhonchi or rales.   Abdominal:      General: Abdomen is flat.      Tenderness: There is abdominal tenderness in the right upper quadrant and left upper quadrant.   Skin:     General: Skin is warm and dry.      Capillary Refill: Capillary refill takes 2 to 3 seconds.      Comments: Fistula with bruit on the right arm.   Neurological:      General: No focal deficit present.      Mental Status: He is alert and oriented to person, place, and time.     ED Course & MDM   ED Course as of 01/10/24 1630   Wed Josiah 10, 2024   1400 CBC, CMP, lipase, troponin, p.o. TC glucose, lactate, EKG, CXR, CT abdomen pelvis ordered. [RT]   1400 CBC showed mild leukocytosis with WBC 13.3, and mild anemia with hemoglobin 12.7.  CMP showed BUN 77, creatinine 9.6.  Lipase WNL.  Troponin negative.  POCT glucose 149.  Lactate 2.8. [RT]   1401 EKG showed NSR with rate 72, OH interval 138, QTc 470. [RT]   1412 CXR showed central pulmonary vascular congestion. [RT]   1428 Discussed patient with his nephrologist, Dr. Aleman, agreed that patient will need dialysis and was okay for hospital admission. [RT]   1527 CT abdomen pelvis showed bilateral symmetric renal cortical atrophy.  No hydronephrosis.  Possible cystitis.  Calcification of the vas  deferens which could be associated with diabetes mellitus. [RT]   1625 Patient deemed appropriate for hospital admission due to need for dialysis, given the patient is a [facility] patient will [facility] to the [facility]. [RT]      ED Course User Index  [RT] Garo Sauer DO         Diagnoses as of 01/10/24 1630   ESRD on dialysis (CMS/HCC)   Pain of upper abdomen   Nausea and vomiting, unspecified vomiting type     Medical Decision Making

## 2024-01-10 NOTE — CONSULTS
[facility]  Consult Note           Reason for Consult:  esrd, fluid overload  Requesting Physician:  Dr. Don    Chief Complaint:  n/v  History Obtained From:  patient, electronic medical record    History of Present Ilness:    53 y.o. year old male admitted with n/v.  Has ESRD due to DM with HTN as well.  Hx of thyroid cancer but that is treated and many years ago.  On transplant list through [facility] and working on listing locally as well.  He does miss 1-2 HD treatments a month.  Works at [employer] as a nurse.  Has some underlying depression.  He started to feel sick over weekend.  Missed hd Monday.  Comes in with n/v and sob.  CT scan mostly ok.  CXR with congestion.  Very uncomfortable now with throwing up     Past Medical History:    Past Medical History:   Diagnosis Date    Encounter for other preprocedural examination 11/03/2016    Pre-op evaluation    Personal history of malignant neoplasm of thyroid     History of malignant neoplasm of thyroid    Personal history of other diseases of the circulatory system     History of hypertension    Personal history of other endocrine, nutritional and metabolic disease     History of diabetes mellitus        Past Surgical History:    Past Surgical History:   Procedure Laterality Date    OTHER SURGICAL HISTORY  12/06/2018    Thyroidectomy    OTHER SURGICAL HISTORY  12/06/2018    Nasal polypectomy    OTHER SURGICAL HISTORY  12/06/2018    Appendectomy        Home Medications:    No current facility-administered medications on file prior to encounter.     Current Outpatient Medications on File Prior to Encounter   Medication Sig Dispense Refill    atorvastatin (Lipitor) 10 mg tablet 1 tablet (10 mg).      cinacalcet HCl (SENSIPAR ORAL) Take 30 mg by mouth.      dexlansoprazole (Dexilant) 60 mg DR capsule Take by mouth.      famotidine (Pepcid) 20 mg tablet 1 tablet (20 mg).      hydrOXYzine pamoate (Vistaril) 50 mg capsule TAKE ONE CAPSULE BY MOUTH AT BEDTIME AS  NEEDED FOR SLEEP *NOTE CAPSULE STRENGTH/DIRECTIONS*      insulin aspart (NovoLOG Flexpen U-100 Insulin) 100 unit/mL (3 mL) pen INJECT SUPPLEMENTAL CORRECTION SUBCUTANEOUSLY THREE TIMES A DAY, WITH MEALS FOR TYPE 2 DIABETES MELLITUS (DISCARD PEN 28 DAYS AFTER FIRST USE)  WITH EACH MEAL  0-149 = ZERO UNITS; -199 = GIVE 1      insulin glargine-yfgn 100 unit/mL (3 mL) Pen INJECT 35 UNITS SUBCUTANEOUSLY AT BEDTIME FOR DIABETES (DISCARD PEN 28 DAYS AFTER FIRST USE) **REPLACES LANTUS SOLOSTAR PEN, SEE LETTER**      levothyroxine (Synthroid) 175 mcg tablet 1 tablet (175 mcg).      propranolol LA (Inderal LA) 160 mg 24 hr capsule TAKE ONE CAPSULE BY MOUTH EVERY MORNING FOR HIGH BLOOD PRESSURE (WITH FOOD)      amitriptyline (Elavil) 25 mg tablet       amLODIPine (Norvasc) 10 mg tablet Take 1 tablet (10 mg) by mouth once daily.      furosemide (Lasix) 20 mg tablet Take 1 tablet (20 mg) by mouth twice a day.      insulin glargine (Lantus) 100 unit/mL injection Inject 20 Units under the skin.      lisinopril 20 mg tablet       losartan (Cozaar) 100 mg tablet Take 1 tablet (100 mg) by mouth once daily.         Allergies:  Aspirin, Nsaids (non-steroidal anti-inflammatory drug), Simvastatin, Topiramate, Acetaminophen, Penicillins, and Omeprazole    Social History:    Social History     Socioeconomic History    Marital status:      Spouse name: Not on file    Number of children: Not on file    Years of education: Not on file    Highest education level: Not on file   Occupational History    Not on file   Tobacco Use    Smoking status: Never    Smokeless tobacco: Never   Substance and Sexual Activity    Alcohol use: Yes    Drug use: Not Currently    Sexual activity: Not on file   Other Topics Concern    Not on file   Social History Narrative    Not on file     Social Determinants of Health     Financial Resource Strain: Not on file   Food Insecurity: Not on file   Transportation Needs: Not on file   Physical Activity:  "Not on file   Stress: Not on file   Social Connections: Not on file   Intimate Partner Violence: Not on file   Housing Stability: Not on file       Family History:   No family history on file.    Review of Systems:   Review of Systems   Constitutional:  Negative for activity change.   HENT:  Negative for congestion.    Eyes:  Negative for discharge.   Respiratory:  Negative for shortness of breath.    Cardiovascular:  Negative for leg swelling.   Gastrointestinal:  Positive for vomiting.   Genitourinary:  Negative for difficulty urinating.   Musculoskeletal:  Negative for arthralgias.   Neurological:  Positive for weakness.   Hematological:  Negative for adenopathy.   Psychiatric/Behavioral:  Negative for agitation.          Physical exam:   Constitutional:  VITALS:  /88   Pulse 68   Temp 36.9 °C (98.4 °F) (Temporal)   Resp 12   Ht 1.778 m (5' 10\")   Wt 96 kg (211 lb 10.3 oz)   SpO2 95%   BMI 30.37 kg/m²      Wt Readings from Last 3 Encounters:   01/10/24 96 kg (211 lb 10.3 oz)   06/14/21 91.8 kg (202 lb 5 oz)   04/30/21 90.5 kg (199 lb 9.6 oz)      Gen: alert, awake, mild distress. Some facial swelling  Head: atraumatic, normocephalic  Skin: no rash, turgor wnl  Heent:  eomi, mmm  Neck: no bruits or jvd noted, thyroid normal  Lungs:  clear to auscultation  Heart:  regular rate and rhythm, no murmurs  Abdomen:  +bs, soft, nt, nd, no hepatosplenomegaly   Extremities: trace edema  Psychiatric: mood and affect appropriate; judgement and insight intact  Musculoskeletal:  Rom, muscular strength intact; digits, nails normal    Data/  CBC:   Lab Results   Component Value Date    WBC 13.3 (H) 01/10/2024    RBC 4.15 (L) 01/10/2024    HGB 12.7 (L) 01/10/2024    HCT 38.1 (L) 01/10/2024    MCV 92 01/10/2024    MCH 30.6 01/10/2024    MCHC 33.3 01/10/2024    RDW 12.8 01/10/2024     01/10/2024     BMP:    Lab Results   Component Value Date     01/10/2024    K 5.2 01/10/2024     01/10/2024    CO2 23 " 01/10/2024    BUN 77 (H) 01/10/2024    CREATININE 9.60 (H) 01/10/2024    CALCIUM 9.2 01/10/2024    GLUCOSE 164 (H) 01/10/2024     CT abdomen pelvis w IV contrast  Narrative: Interpreted By:  Schoenberger, Joseph,   STUDY:  CT ABDOMEN PELVIS W IV CONTRAST;  1/10/2024 3:11 pm      INDICATION:  Signs/Symptoms:abd pain n/v.      COMPARISON:  01/19/2021      ACCESSION NUMBER(S):  [id number]      ORDERING CLINICIAN:  EDUARD BAKER      TECHNIQUE:  CT of the abdomen and pelvis was performed.  Standard contiguous  axial images were obtained at 3 mm slice thickness through the  abdomen and pelvis. Coronal and sagittal reconstructions at 3 mm  slice thickness were performed.      75 ml of contrast Omnipaque 350 were administered intravenously  without immediate complication.      FINDINGS:  LOWER CHEST:  There are some minimal areas of platelike atelectasis in both lung  bases. There is a small hiatus hernia. Otherwise unremarkable.      ABDOMEN:      LIVER:  Within normal limits.      BILE DUCTS:  Normal caliber.      GALLBLADDER:  No calcified stones. No wall thickening.      PANCREAS:  Within normal limits.      SPLEEN:  Within normal limits.      ADRENAL GLANDS:  Bilateral adrenal glands appear normal.      KIDNEYS AND URETERS:  There is cortical atrophy of both kidneys. Otherwise the enhanced  symmetrically. No hydroureteronephrosis or nephroureterolithiasis is  identified.      PELVIS:      BLADDER:  Abnormal appearing with mild mural thickening but considerable  infiltration of the adjacent fat. Consider cystitis.      REPRODUCTIVE ORGANS:  There is calcification of the vas deferens. This is a finding that is  often associated with diabetes mellitus. Correlate with known history  is.      BOWEL:  The stomach is unremarkable.   The small and large bowel are normal  in caliber and demonstrate no wall thickening.   The colon is  relatively collapsed along its course. The appendix is not seen      VESSELS:  There is no  "aneurysmal dilatation of the abdominal aorta. The IVC  appears normal.      PERITONEUM/RETROPERITONEUM/LYMPH NODES:  No ascites or free air, no fluid collection.  No abdominopelvic  lymphadenopathy is present.      BONES AND ABDOMINAL WALL:  No suspicious osseous lesions are identified.  Abdominal wall  structures appear intact.      Impression: 1.  Bilateral symmetric renal cortical atrophy. No hydronephrosis.  2. Possible cystitis. Correlate clinically.  3. Calcification of the vas deferens which could be associated with  diabetes mellitus          MACRO:  None      Signed by: Joseph Schoenberger 1/10/2024 3:20 PM  Dictation workstation:   [id number]  XR chest 1 view  Narrative: Interpreted By:  Nory Diaz,   STUDY:  XR CHEST 1 VIEW;  1/10/2024 1:27 pm      INDICATION:  Signs/Symptoms:retching.      COMPARISON:  None.      ACCESSION NUMBER(S):  [id number]      ORDERING CLINICIAN:  PEE CAUSEY      FINDINGS:  No consolidation. No pleural effusion or pneumothorax. Central  pulmonary vascular congestion. Normal heart size. No acute osseous  abnormality.      Impression: Central pulmonary vascular congestion.      Signed by: Nory Diaz 1/10/2024 2:04 PM  Dictation workstation:   [id number]    LFT:   Lab Results   Component Value Date    AST 30 01/10/2024    ALT 20 01/10/2024    ALKPHOS 74 01/10/2024    BILITOT 0.6 01/10/2024      Urinalysis: No results found for: \"STONE\", \"PROTUR\", \"GLUCOSEU\", \"BLOODU\", \"KETONESU\", \"BILIRUBINU\", \"NITRITEU\", \"LEUKOCYTESU\", \"UTPCR\"   Imaging: CT abdomen pelvis w IV contrast  Narrative: Interpreted By:  Schoenberger, Joseph,   STUDY:  CT ABDOMEN PELVIS W IV CONTRAST;  1/10/2024 3:11 pm      INDICATION:  Signs/Symptoms:abd pain n/v.      COMPARISON:  01/19/2021      ACCESSION NUMBER(S):  [id number]      ORDERING CLINICIAN:  EDUARD BAKER      TECHNIQUE:  CT of the abdomen and pelvis was performed.  Standard contiguous  axial images were obtained at 3 mm slice " thickness through the  abdomen and pelvis. Coronal and sagittal reconstructions at 3 mm  slice thickness were performed.      75 ml of contrast Omnipaque 350 were administered intravenously  without immediate complication.      FINDINGS:  LOWER CHEST:  There are some minimal areas of platelike atelectasis in both lung  bases. There is a small hiatus hernia. Otherwise unremarkable.      ABDOMEN:      LIVER:  Within normal limits.      BILE DUCTS:  Normal caliber.      GALLBLADDER:  No calcified stones. No wall thickening.      PANCREAS:  Within normal limits.      SPLEEN:  Within normal limits.      ADRENAL GLANDS:  Bilateral adrenal glands appear normal.      KIDNEYS AND URETERS:  There is cortical atrophy of both kidneys. Otherwise the enhanced  symmetrically. No hydroureteronephrosis or nephroureterolithiasis is  identified.      PELVIS:      BLADDER:  Abnormal appearing with mild mural thickening but considerable  infiltration of the adjacent fat. Consider cystitis.      REPRODUCTIVE ORGANS:  There is calcification of the vas deferens. This is a finding that is  often associated with diabetes mellitus. Correlate with known history  is.      BOWEL:  The stomach is unremarkable.   The small and large bowel are normal  in caliber and demonstrate no wall thickening.   The colon is  relatively collapsed along its course. The appendix is not seen      VESSELS:  There is no aneurysmal dilatation of the abdominal aorta. The IVC  appears normal.      PERITONEUM/RETROPERITONEUM/LYMPH NODES:  No ascites or free air, no fluid collection.  No abdominopelvic  lymphadenopathy is present.      BONES AND ABDOMINAL WALL:  No suspicious osseous lesions are identified.  Abdominal wall  structures appear intact.      Impression: 1.  Bilateral symmetric renal cortical atrophy. No hydronephrosis.  2. Possible cystitis. Correlate clinically.  3. Calcification of the vas deferens which could be associated with  diabetes mellitus           MACRO:  None      Signed by: Joseph Schoenberger 1/10/2024 3:20 PM  Dictation workstation:   [id number]  XR chest 1 view  Narrative: Interpreted By:  Noyr Diaz,   STUDY:  XR CHEST 1 VIEW;  1/10/2024 1:27 pm      INDICATION:  Signs/Symptoms:retching.      COMPARISON:  None.      ACCESSION NUMBER(S):  [id number]      ORDERING CLINICIAN:  PEE CAUSEY      FINDINGS:  No consolidation. No pleural effusion or pneumothorax. Central  pulmonary vascular congestion. Normal heart size. No acute osseous  abnormality.      Impression: Central pulmonary vascular congestion.      Signed by: Nory Diaz 1/10/2024 2:04 PM  Dictation workstation:   [id number]           Assessment/  53 y.o. yo male admitted with nausea and vomiting.  Wbc 13k.  Has ESRD on HD MWF - missed HD Monday.  Access right sided av fistula.  Working on transplant.  CT scan a/p mostly neg.  Has some fluid overload        Plan/  HD tonight and in am  See response to HD and determine if any other work up needed  Discussed with him about missing HD  Outpatient follow up from renal standpoint: Dr. Aleman    Thank you for the consultation.  Please do not hesitate to call with questions.    Jaylan Aleman MD

## 2024-01-11 ENCOUNTER — APPOINTMENT (OUTPATIENT)
Dept: DIALYSIS | Facility: HOSPITAL | Age: 54
End: 2024-01-11
Payer: OTHER GOVERNMENT

## 2024-01-11 VITALS
BODY MASS INDEX: 30.3 KG/M2 | OXYGEN SATURATION: 98 % | RESPIRATION RATE: 20 BRPM | HEIGHT: 70 IN | HEART RATE: 94 BPM | WEIGHT: 211.64 LBS | SYSTOLIC BLOOD PRESSURE: 138 MMHG | DIASTOLIC BLOOD PRESSURE: 75 MMHG | TEMPERATURE: 97.5 F

## 2024-01-11 LAB
ANION GAP SERPL CALC-SCNC: 19 MMOL/L (ref 10–20)
BUN SERPL-MCNC: 50 MG/DL (ref 6–23)
CALCIUM SERPL-MCNC: 9 MG/DL (ref 8.6–10.3)
CHLORIDE SERPL-SCNC: 99 MMOL/L (ref 98–107)
CO2 SERPL-SCNC: 24 MMOL/L (ref 21–32)
CREAT SERPL-MCNC: 7.66 MG/DL (ref 0.5–1.3)
EGFRCR SERPLBLD CKD-EPI 2021: 8 ML/MIN/1.73M*2
ERYTHROCYTE [DISTWIDTH] IN BLOOD BY AUTOMATED COUNT: 13.2 % (ref 11.5–14.5)
GLUCOSE BLD MANUAL STRIP-MCNC: 170 MG/DL (ref 74–99)
GLUCOSE BLD MANUAL STRIP-MCNC: 216 MG/DL (ref 74–99)
GLUCOSE SERPL-MCNC: 175 MG/DL (ref 74–99)
HCT VFR BLD AUTO: 39.4 % (ref 41–52)
HGB BLD-MCNC: 12.5 G/DL (ref 13.5–17.5)
HOLD SPECIMEN: NORMAL
MAGNESIUM SERPL-MCNC: 2.44 MG/DL (ref 1.6–2.4)
MCH RBC QN AUTO: 30.3 PG (ref 26–34)
MCHC RBC AUTO-ENTMCNC: 31.7 G/DL (ref 32–36)
MCV RBC AUTO: 96 FL (ref 80–100)
NRBC BLD-RTO: 0 /100 WBCS (ref 0–0)
PLATELET # BLD AUTO: 223 X10*3/UL (ref 150–450)
POTASSIUM SERPL-SCNC: 4 MMOL/L (ref 3.5–5.3)
RBC # BLD AUTO: 4.12 X10*6/UL (ref 4.5–5.9)
SODIUM SERPL-SCNC: 138 MMOL/L (ref 136–145)
WBC # BLD AUTO: 9.3 X10*3/UL (ref 4.4–11.3)

## 2024-01-11 PROCEDURE — 2500000004 HC RX 250 GENERAL PHARMACY W/ HCPCS (ALT 636 FOR OP/ED): Performed by: STUDENT IN AN ORGANIZED HEALTH CARE EDUCATION/TRAINING PROGRAM

## 2024-01-11 PROCEDURE — 82947 ASSAY GLUCOSE BLOOD QUANT: CPT

## 2024-01-11 PROCEDURE — 80048 BASIC METABOLIC PNL TOTAL CA: CPT | Performed by: STUDENT IN AN ORGANIZED HEALTH CARE EDUCATION/TRAINING PROGRAM

## 2024-01-11 PROCEDURE — 83735 ASSAY OF MAGNESIUM: CPT | Performed by: STUDENT IN AN ORGANIZED HEALTH CARE EDUCATION/TRAINING PROGRAM

## 2024-01-11 PROCEDURE — 85027 COMPLETE CBC AUTOMATED: CPT | Performed by: STUDENT IN AN ORGANIZED HEALTH CARE EDUCATION/TRAINING PROGRAM

## 2024-01-11 PROCEDURE — 8010000001 HC DIALYSIS - HEMODIALYSIS PER DAY

## 2024-01-11 PROCEDURE — 36415 COLL VENOUS BLD VENIPUNCTURE: CPT | Performed by: STUDENT IN AN ORGANIZED HEALTH CARE EDUCATION/TRAINING PROGRAM

## 2024-01-11 PROCEDURE — 99239 HOSP IP/OBS DSCHRG MGMT >30: CPT | Performed by: STUDENT IN AN ORGANIZED HEALTH CARE EDUCATION/TRAINING PROGRAM

## 2024-01-11 RX ADMIN — HEPARIN SODIUM 5000 UNITS: 5000 INJECTION INTRAVENOUS; SUBCUTANEOUS at 05:29

## 2024-01-11 SDOH — SOCIAL STABILITY: SOCIAL INSECURITY: HAS ANYONE EVER THREATENED TO HURT YOUR FAMILY OR YOUR PETS?: NO

## 2024-01-11 SDOH — SOCIAL STABILITY: SOCIAL INSECURITY: ARE YOU OR HAVE YOU BEEN THREATENED OR ABUSED PHYSICALLY, EMOTIONALLY, OR SEXUALLY BY ANYONE?: NO

## 2024-01-11 SDOH — SOCIAL STABILITY: SOCIAL INSECURITY: ARE THERE ANY APPARENT SIGNS OF INJURIES/BEHAVIORS THAT COULD BE RELATED TO ABUSE/NEGLECT?: NO

## 2024-01-11 SDOH — SOCIAL STABILITY: SOCIAL INSECURITY: DOES ANYONE TRY TO KEEP YOU FROM HAVING/CONTACTING OTHER FRIENDS OR DOING THINGS OUTSIDE YOUR HOME?: NO

## 2024-01-11 SDOH — SOCIAL STABILITY: SOCIAL INSECURITY: HAVE YOU HAD THOUGHTS OF HARMING ANYONE ELSE?: NO

## 2024-01-11 SDOH — SOCIAL STABILITY: SOCIAL INSECURITY: WERE YOU ABLE TO COMPLETE ALL THE BEHAVIORAL HEALTH SCREENINGS?: YES

## 2024-01-11 SDOH — SOCIAL STABILITY: SOCIAL INSECURITY: DO YOU FEEL UNSAFE GOING BACK TO THE PLACE WHERE YOU ARE LIVING?: NO

## 2024-01-11 SDOH — SOCIAL STABILITY: SOCIAL INSECURITY: DO YOU FEEL ANYONE HAS EXPLOITED OR TAKEN ADVANTAGE OF YOU FINANCIALLY OR OF YOUR PERSONAL PROPERTY?: NO

## 2024-01-11 SDOH — SOCIAL STABILITY: SOCIAL INSECURITY: ABUSE: ADULT

## 2024-01-11 ASSESSMENT — LIFESTYLE VARIABLES
HOW MANY STANDARD DRINKS CONTAINING ALCOHOL DO YOU HAVE ON A TYPICAL DAY: PATIENT DOES NOT DRINK
AUDIT-C TOTAL SCORE: 0
AUDIT-C TOTAL SCORE: 0
SKIP TO QUESTIONS 9-10: 1
HOW OFTEN DO YOU HAVE A DRINK CONTAINING ALCOHOL: NEVER
HOW OFTEN DO YOU HAVE 6 OR MORE DRINKS ON ONE OCCASION: NEVER

## 2024-01-11 ASSESSMENT — COGNITIVE AND FUNCTIONAL STATUS - GENERAL
MOBILITY SCORE: 24
DAILY ACTIVITIY SCORE: 24

## 2024-01-11 ASSESSMENT — PAIN - FUNCTIONAL ASSESSMENT
PAIN_FUNCTIONAL_ASSESSMENT: NO/DENIES PAIN
PAIN_FUNCTIONAL_ASSESSMENT: NO/DENIES PAIN

## 2024-01-11 ASSESSMENT — PATIENT HEALTH QUESTIONNAIRE - PHQ9
SUM OF ALL RESPONSES TO PHQ9 QUESTIONS 1 & 2: 0
2. FEELING DOWN, DEPRESSED OR HOPELESS: NOT AT ALL
1. LITTLE INTEREST OR PLEASURE IN DOING THINGS: NOT AT ALL

## 2024-01-11 ASSESSMENT — PAIN SCALES - GENERAL
PAINLEVEL_OUTOF10: 0 - NO PAIN

## 2024-01-11 NOTE — DISCHARGE SUMMARY
Discharge Diagnosis  ESRD on dialysis (CMS/[facility])    Issues Requiring Follow-Up  Follow up with nephrology    Discharge Meds     Your medication list        ASK your doctor about these medications        Instructions Last Dose Given Next Dose Due   amitriptyline 25 mg tablet  Commonly known as: Elavil           amLODIPine 10 mg tablet  Commonly known as: Norvasc           atorvastatin 10 mg tablet  Commonly known as: Lipitor           Dexilant 60 mg DR capsule  Generic drug: dexlansoprazole           famotidine 20 mg tablet  Commonly known as: Pepcid           furosemide 20 mg tablet  Commonly known as: Lasix           hydrOXYzine pamoate 50 mg capsule  Commonly known as: Vistaril           insulin glargine 100 unit/mL injection  Commonly known as: Lantus           insulin glargine-yfgn 100 unit/mL (3 mL) Pen           lisinopril 20 mg tablet           losartan 100 mg tablet  Commonly known as: Cozaar           NovoLOG Flexpen U-100 Insulin 100 unit/mL (3 mL) pen  Generic drug: insulin aspart           propranolol  mg 24 hr capsule  Commonly known as: Inderal LA           SENSIPAR ORAL           Synthroid 175 mcg tablet  Generic drug: levothyroxine                    Test Results Pending At Discharge  Pending Labs       No current pending labs.            Hospital Course     Duane Adams is a 53 y.o. male presenting with nausea and vomiting.  Patient is a [facility] patient with a past medical history significant for ESRD on hemodialysis Monday Wednesday Friday, type 2 diabetes, thyroid cancer status post resection.  Patient states on Monday, he was not feeling well therefore he missed his dialysis session.  Since then he has been getting nauseous and feeling worse.  Patient does endorse history of cyclic vomiting syndrome.  Patient also has scheduled dialysis today, but felt too nauseous to go therefore prompted to the ER for further evaluation.  Denies any fever, chills, shortness of breath, chest pain, diarrhea.   He does endorse mild epigastric pain.     While in the ER, his blood pressure was elevated likely due to persistent nausea and vomiting. BMP showed elevated creatinine and BUN, this is from his ESRD.  Lactate was 2.8 likely due to multiple episodes of nausea and vomiting.  WBC 13.3, which could be reactive.  Hemoglobin 12.7.  Chest x-ray showed central pulmonary vascular congestion.  CT abdomen was done showed bilateral symmetrical renal cortical atrophy without any hydronephrosis.  Possible cystitis.  Case was discussed with patient's primary nephrologist, patient will be admitted for dialysis and further evaluation.     Per ER report, [facility], patient will be admitted here for treatment.    Hospital course  Patient remained clinically stable since admission.  Patient was seen by nephrology, dialysis was done on the night of admission.  Patient nausea and vomiting improved overnight.  Patient was reevaluated by nephrology team, recommend to repeat another dialysis session prior to discharge, then patient can go back to Monday Wednesday Friday schedule.  Patient will be discharged after hemodialysis today.    I have spent > 30min discharging the pt, care time spent include discharge planning, medication reconciliation and discussion of discharge instruction/follow up with the patient.      Pertinent Physical Exam At Time of Discharge  Constitutional: Well developed, awake/alert/oriented x3, no distress, alert and cooperative  Eyes: PERRL, EOMI, clear sclera  ENMT: mucous membranes moist  Head/Neck: Neck supple  Respiratory/Thorax: CTA b/l.   Cardiovascular: Regular, rate and rhythm, no murmurs  Gastrointestinal: Nondistended, soft, non-tender  Musculoskeletal: ROM intact  Extremities: normal extremities      Outpatient Follow-Up  Future Appointments   Date Time Provider Department Center   1/11/2024  2:30 PM [facility] None         Arnie Don MD

## 2024-01-11 NOTE — CARE PLAN
Pt had no acute changes noted this shift. Pt had dialysis tonight and was able to tolerate well. Pt had compazine and dilaudid and was able to sleep. Pt vss. Pt currently NPO. Pt safety been maintained.

## 2024-01-11 NOTE — DISCHARGE INSTRUCTIONS
#it was my pleasure taking care of you during this hospitalization    #you were admitted due to intractable nausea/vomiting and missing hemodialysis    #you were seen by Dr. Ivan, HD was done on 1/10 evening and 1/11 with improvement of your symptoms    #no changes on your medications were done. Please continue to follow up with nephrology after discharge for HD.

## 2024-01-11 NOTE — PROGRESS NOTES
"Nephrology Progress Note    Assessment:  53 y.o. yo male admitted with nausea and vomiting.  Wbc 13k.  Has ESRD on HD MWF - missed HD Monday.  Access right sided av fistula.  Working on transplant.  Has some fluid overload. CT A/P showing possible cystitis. Feeling better today. N/V resolved      Plan:  HD today then continue MWF schedule  Ok to discharge from renal standpoint following HD rx if medically cleared       Outpatient follow up from renal standpoint: Dr. Aleman      Subjective:  Admit Date: 1/10/2024    Interval History: had HD yesterday, was able to tolerate, did well overnight, CT A/P showing possible cystitis     Medications:  Scheduled Meds:heparin, 3,000 Units, intravenous, Once  heparin (porcine), 5,000 Units, subcutaneous, q8h REBEL  heparin, 3,000 Units, intra-catheter, After Dialysis  heparin, 3,000 Units, intra-catheter, After Dialysis  insulin lispro, 0-10 Units, subcutaneous, TID with meals      Continuous Infusions:     CBC:   Lab Results   Component Value Date    WBC 9.3 01/11/2024    RBC 4.12 (L) 01/11/2024    HGB 12.5 (L) 01/11/2024    HCT 39.4 (L) 01/11/2024    MCV 96 01/11/2024    MCH 30.3 01/11/2024    MCHC 31.7 (L) 01/11/2024    RDW 13.2 01/11/2024     01/11/2024     BMP:    Lab Results   Component Value Date     01/11/2024    K 4.0 01/11/2024    CL 99 01/11/2024    CO2 24 01/11/2024    BUN 50 (H) 01/11/2024    CREATININE 7.66 (H) 01/11/2024    CALCIUM 9.0 01/11/2024    GLUCOSE 175 (H) 01/11/2024       Objective:  Vitals: /64 (BP Location: Right arm, Patient Position: Lying)   Pulse 79   Temp 37.2 °C (99 °F) (Temporal)   Resp 20   Ht 1.778 m (5' 10\")   Wt 96 kg (211 lb 10.3 oz)   SpO2 95%   BMI 30.37 kg/m²    Wt Readings from Last 3 Encounters:   01/10/24 96 kg (211 lb 10.3 oz)   06/14/21 91.8 kg (202 lb 5 oz)   04/30/21 90.5 kg (199 lb 9.6 oz)      24HR INTAKE/OUTPUT:    Intake/Output Summary (Last 24 hours) at 1/11/2024 1007  Last data filed at 1/11/2024 " 0005  Gross per 24 hour   Intake 600 ml   Output 150 ml   Net 450 ml       General: alert, in no apparent distress  HEENT: normocephalic, atraumatic, anicteric  Neck: supple, no mass  Lungs: non-labored respirations, clear to auscultation bilaterally  Heart: regular rate and rhythm, no murmurs or rubs  Abdomen: soft, non-tender, non-distended  Ext: no cyanosis, trace peripheral edema  Neuro: alert and oriented, no gross abnormalities      Electronically signed by Viktor Alvarez MD, MD

## 2024-01-11 NOTE — PROCEDURES
Special notes: Chronic patient MWF at [facility] on [address].    Name: Duane Adams  MRN: [id number]  Date of Service:  1/10/2024    Start time: 9:32 pm  End time: 12:02 am    Fluid removed: 2.5L    Intra dialytic events/notes: Patient was nauseous most of the treatment.

## 2024-01-12 ENCOUNTER — APPOINTMENT (OUTPATIENT)
Dept: CARDIOLOGY | Facility: HOSPITAL | Age: 54
DRG: 073 | End: 2024-01-12
Payer: OTHER GOVERNMENT

## 2024-01-12 ENCOUNTER — HOSPITAL ENCOUNTER (INPATIENT)
Facility: HOSPITAL | Age: 54
LOS: 3 days | Discharge: HOME | DRG: 073 | End: 2024-01-17
Attending: EMERGENCY MEDICINE | Admitting: INTERNAL MEDICINE
Payer: OTHER GOVERNMENT

## 2024-01-12 ENCOUNTER — TELEPHONE (OUTPATIENT)
Dept: TRANSPLANT | Facility: HOSPITAL | Age: 54
End: 2024-01-12

## 2024-01-12 DIAGNOSIS — R11.2 NAUSEA AND VOMITING, UNSPECIFIED VOMITING TYPE: ICD-10-CM

## 2024-01-12 DIAGNOSIS — R11.2 INTRACTABLE NAUSEA AND VOMITING: Primary | ICD-10-CM

## 2024-01-12 DIAGNOSIS — R11.14 BILIOUS VOMITING WITH NAUSEA: ICD-10-CM

## 2024-01-12 LAB
ALBUMIN SERPL BCP-MCNC: 4.8 G/DL (ref 3.4–5)
ALP SERPL-CCNC: 75 U/L (ref 33–120)
ALT SERPL W P-5'-P-CCNC: 22 U/L (ref 10–52)
ANION GAP SERPL CALC-SCNC: 24 MMOL/L (ref 10–20)
APPEARANCE UR: ABNORMAL
AST SERPL W P-5'-P-CCNC: 28 U/L (ref 9–39)
ATRIAL RATE: 72 BPM
BASOPHILS # BLD AUTO: 0.06 X10*3/UL (ref 0–0.1)
BASOPHILS NFR BLD AUTO: 0.5 %
BILIRUB SERPL-MCNC: 0.8 MG/DL (ref 0–1.2)
BILIRUB UR STRIP.AUTO-MCNC: NEGATIVE MG/DL
BUN SERPL-MCNC: 36 MG/DL (ref 6–23)
CALCIUM SERPL-MCNC: 10.7 MG/DL (ref 8.6–10.3)
CHLORIDE SERPL-SCNC: 92 MMOL/L (ref 98–107)
CO2 SERPL-SCNC: 24 MMOL/L (ref 21–32)
COLOR UR: YELLOW
CREAT SERPL-MCNC: 6.61 MG/DL (ref 0.5–1.3)
EGFRCR SERPLBLD CKD-EPI 2021: 9 ML/MIN/1.73M*2
EOSINOPHIL # BLD AUTO: 0.13 X10*3/UL (ref 0–0.7)
EOSINOPHIL NFR BLD AUTO: 1.1 %
ERYTHROCYTE [DISTWIDTH] IN BLOOD BY AUTOMATED COUNT: 12.7 % (ref 11.5–14.5)
FLUAV RNA RESP QL NAA+PROBE: NOT DETECTED
FLUBV RNA RESP QL NAA+PROBE: NOT DETECTED
GLUCOSE BLD MANUAL STRIP-MCNC: 191 MG/DL (ref 74–99)
GLUCOSE BLD MANUAL STRIP-MCNC: 296 MG/DL (ref 74–99)
GLUCOSE SERPL-MCNC: 124 MG/DL (ref 74–99)
GLUCOSE UR STRIP.AUTO-MCNC: ABNORMAL MG/DL
HCT VFR BLD AUTO: 44.5 % (ref 41–52)
HGB BLD-MCNC: 14.7 G/DL (ref 13.5–17.5)
HYALINE CASTS #/AREA URNS AUTO: ABNORMAL /LPF
IMM GRANULOCYTES # BLD AUTO: 0.03 X10*3/UL (ref 0–0.7)
IMM GRANULOCYTES NFR BLD AUTO: 0.3 % (ref 0–0.9)
KETONES UR STRIP.AUTO-MCNC: NEGATIVE MG/DL
LACTATE SERPL-SCNC: 1.7 MMOL/L (ref 0.4–2)
LACTATE SERPL-SCNC: 5.5 MMOL/L (ref 0.4–2)
LEUKOCYTE ESTERASE UR QL STRIP.AUTO: NEGATIVE
LIPASE SERPL-CCNC: 53 U/L (ref 9–82)
LYMPHOCYTES # BLD AUTO: 1.71 X10*3/UL (ref 1.2–4.8)
LYMPHOCYTES NFR BLD AUTO: 15 %
MAGNESIUM SERPL-MCNC: 2.22 MG/DL (ref 1.6–2.4)
MCH RBC QN AUTO: 30.3 PG (ref 26–34)
MCHC RBC AUTO-ENTMCNC: 33 G/DL (ref 32–36)
MCV RBC AUTO: 92 FL (ref 80–100)
MONOCYTES # BLD AUTO: 1.14 X10*3/UL (ref 0.1–1)
MONOCYTES NFR BLD AUTO: 10 %
NEUTROPHILS # BLD AUTO: 8.3 X10*3/UL (ref 1.2–7.7)
NEUTROPHILS NFR BLD AUTO: 73.1 %
NITRITE UR QL STRIP.AUTO: NEGATIVE
NRBC BLD-RTO: 0 /100 WBCS (ref 0–0)
P AXIS: -1 DEGREES
P OFFSET: 205 MS
P ONSET: 156 MS
PH UR STRIP.AUTO: 5 [PH]
PLATELET # BLD AUTO: 258 X10*3/UL (ref 150–450)
POTASSIUM SERPL-SCNC: 3.4 MMOL/L (ref 3.5–5.3)
PR INTERVAL: 138 MS
PROT SERPL-MCNC: 8.5 G/DL (ref 6.4–8.2)
PROT UR STRIP.AUTO-MCNC: ABNORMAL MG/DL
Q ONSET: 225 MS
QRS COUNT: 12 BEATS
QRS DURATION: 86 MS
QT INTERVAL: 430 MS
QTC CALCULATION(BAZETT): 470 MS
QTC FREDERICIA: 457 MS
R AXIS: 15 DEGREES
RBC # BLD AUTO: 4.85 X10*6/UL (ref 4.5–5.9)
RBC # UR STRIP.AUTO: ABNORMAL /UL
RBC #/AREA URNS AUTO: ABNORMAL /HPF
RSV RNA RESP QL NAA+PROBE: NOT DETECTED
SARS-COV-2 RNA RESP QL NAA+PROBE: NOT DETECTED
SODIUM SERPL-SCNC: 137 MMOL/L (ref 136–145)
SP GR UR STRIP.AUTO: 1.02
T AXIS: 42 DEGREES
T OFFSET: 440 MS
UROBILINOGEN UR STRIP.AUTO-MCNC: <2 MG/DL
VENTRICULAR RATE: 72 BPM
WBC # BLD AUTO: 11.4 X10*3/UL (ref 4.4–11.3)
WBC #/AREA URNS AUTO: ABNORMAL /HPF

## 2024-01-12 PROCEDURE — 36415 COLL VENOUS BLD VENIPUNCTURE: CPT | Performed by: EMERGENCY MEDICINE

## 2024-01-12 PROCEDURE — 87637 SARSCOV2&INF A&B&RSV AMP PRB: CPT | Performed by: EMERGENCY MEDICINE

## 2024-01-12 PROCEDURE — 85025 COMPLETE CBC W/AUTO DIFF WBC: CPT | Performed by: EMERGENCY MEDICINE

## 2024-01-12 PROCEDURE — 83690 ASSAY OF LIPASE: CPT | Performed by: EMERGENCY MEDICINE

## 2024-01-12 PROCEDURE — 2500000001 HC RX 250 WO HCPCS SELF ADMINISTERED DRUGS (ALT 637 FOR MEDICARE OP): Performed by: EMERGENCY MEDICINE

## 2024-01-12 PROCEDURE — 93010 ELECTROCARDIOGRAM REPORT: CPT | Performed by: INTERNAL MEDICINE

## 2024-01-12 PROCEDURE — 87086 URINE CULTURE/COLONY COUNT: CPT | Mod: STJLAB | Performed by: EMERGENCY MEDICINE

## 2024-01-12 PROCEDURE — 93005 ELECTROCARDIOGRAM TRACING: CPT

## 2024-01-12 PROCEDURE — 96361 HYDRATE IV INFUSION ADD-ON: CPT

## 2024-01-12 PROCEDURE — 83605 ASSAY OF LACTIC ACID: CPT | Performed by: EMERGENCY MEDICINE

## 2024-01-12 PROCEDURE — 2500000004 HC RX 250 GENERAL PHARMACY W/ HCPCS (ALT 636 FOR OP/ED): Performed by: INTERNAL MEDICINE

## 2024-01-12 PROCEDURE — 83735 ASSAY OF MAGNESIUM: CPT | Performed by: EMERGENCY MEDICINE

## 2024-01-12 PROCEDURE — 2500000001 HC RX 250 WO HCPCS SELF ADMINISTERED DRUGS (ALT 637 FOR MEDICARE OP): Performed by: INTERNAL MEDICINE

## 2024-01-12 PROCEDURE — 96374 THER/PROPH/DIAG INJ IV PUSH: CPT | Mod: 59

## 2024-01-12 PROCEDURE — 99285 EMERGENCY DEPT VISIT HI MDM: CPT | Performed by: EMERGENCY MEDICINE

## 2024-01-12 PROCEDURE — G0378 HOSPITAL OBSERVATION PER HR: HCPCS

## 2024-01-12 PROCEDURE — 80053 COMPREHEN METABOLIC PANEL: CPT | Performed by: EMERGENCY MEDICINE

## 2024-01-12 PROCEDURE — 82947 ASSAY GLUCOSE BLOOD QUANT: CPT

## 2024-01-12 PROCEDURE — 2500000002 HC RX 250 W HCPCS SELF ADMINISTERED DRUGS (ALT 637 FOR MEDICARE OP, ALT 636 FOR OP/ED): Performed by: INTERNAL MEDICINE

## 2024-01-12 PROCEDURE — 96376 TX/PRO/DX INJ SAME DRUG ADON: CPT

## 2024-01-12 PROCEDURE — 99223 1ST HOSP IP/OBS HIGH 75: CPT | Performed by: INTERNAL MEDICINE

## 2024-01-12 PROCEDURE — 81001 URINALYSIS AUTO W/SCOPE: CPT | Performed by: EMERGENCY MEDICINE

## 2024-01-12 PROCEDURE — 2500000004 HC RX 250 GENERAL PHARMACY W/ HCPCS (ALT 636 FOR OP/ED): Performed by: EMERGENCY MEDICINE

## 2024-01-12 PROCEDURE — 96375 TX/PRO/DX INJ NEW DRUG ADDON: CPT

## 2024-01-12 RX ORDER — PROCHLORPERAZINE EDISYLATE 5 MG/ML
10 INJECTION INTRAMUSCULAR; INTRAVENOUS EVERY 6 HOURS PRN
Status: DISCONTINUED | OUTPATIENT
Start: 2024-01-12 | End: 2024-01-17 | Stop reason: HOSPADM

## 2024-01-12 RX ORDER — HYDROXYZINE HYDROCHLORIDE 50 MG/1
50 TABLET, FILM COATED ORAL NIGHTLY PRN
Status: DISCONTINUED | OUTPATIENT
Start: 2024-01-12 | End: 2024-01-17 | Stop reason: HOSPADM

## 2024-01-12 RX ORDER — MIRTAZAPINE 15 MG/1
7.5 TABLET, FILM COATED ORAL NIGHTLY
Status: DISCONTINUED | OUTPATIENT
Start: 2024-01-12 | End: 2024-01-17 | Stop reason: HOSPADM

## 2024-01-12 RX ORDER — PROCHLORPERAZINE EDISYLATE 5 MG/ML
10 INJECTION INTRAMUSCULAR; INTRAVENOUS ONCE
Status: COMPLETED | OUTPATIENT
Start: 2024-01-12 | End: 2024-01-12

## 2024-01-12 RX ORDER — LEVOTHYROXINE SODIUM 175 UG/1
175 TABLET ORAL DAILY
Status: DISCONTINUED | OUTPATIENT
Start: 2024-01-12 | End: 2024-01-17 | Stop reason: HOSPADM

## 2024-01-12 RX ORDER — PROPRANOLOL HYDROCHLORIDE 80 MG/1
160 CAPSULE, EXTENDED RELEASE ORAL DAILY
Status: DISCONTINUED | OUTPATIENT
Start: 2024-01-12 | End: 2024-01-17 | Stop reason: HOSPADM

## 2024-01-12 RX ORDER — LORATADINE 10 MG/1
10 TABLET ORAL DAILY
Status: DISCONTINUED | OUTPATIENT
Start: 2024-01-12 | End: 2024-01-17 | Stop reason: HOSPADM

## 2024-01-12 RX ORDER — DROPERIDOL 2.5 MG/ML
1.25 INJECTION, SOLUTION INTRAMUSCULAR; INTRAVENOUS ONCE
Status: COMPLETED | OUTPATIENT
Start: 2024-01-12 | End: 2024-01-12

## 2024-01-12 RX ORDER — FENTANYL CITRATE 50 UG/ML
50 INJECTION, SOLUTION INTRAMUSCULAR; INTRAVENOUS ONCE
Status: COMPLETED | OUTPATIENT
Start: 2024-01-12 | End: 2024-01-12

## 2024-01-12 RX ORDER — INSULIN LISPRO 100 [IU]/ML
0-10 INJECTION, SOLUTION INTRAVENOUS; SUBCUTANEOUS
Status: DISCONTINUED | OUTPATIENT
Start: 2024-01-12 | End: 2024-01-17 | Stop reason: HOSPADM

## 2024-01-12 RX ORDER — PANTOPRAZOLE SODIUM 40 MG/1
40 TABLET, DELAYED RELEASE ORAL
Status: DISCONTINUED | OUTPATIENT
Start: 2024-01-13 | End: 2024-01-14

## 2024-01-12 RX ORDER — LORAZEPAM 0.5 MG/1
0.5 TABLET ORAL NIGHTLY PRN
Status: DISCONTINUED | OUTPATIENT
Start: 2024-01-12 | End: 2024-01-17 | Stop reason: HOSPADM

## 2024-01-12 RX ORDER — AMOXICILLIN 250 MG
2 CAPSULE ORAL DAILY
Status: DISCONTINUED | OUTPATIENT
Start: 2024-01-12 | End: 2024-01-17 | Stop reason: HOSPADM

## 2024-01-12 RX ORDER — CINACALCET 30 MG/1
30 TABLET, FILM COATED ORAL
Status: DISCONTINUED | OUTPATIENT
Start: 2024-01-12 | End: 2024-01-15

## 2024-01-12 RX ORDER — PROCHLORPERAZINE 25 MG/1
25 SUPPOSITORY RECTAL EVERY 12 HOURS PRN
Status: DISCONTINUED | OUTPATIENT
Start: 2024-01-12 | End: 2024-01-17 | Stop reason: HOSPADM

## 2024-01-12 RX ORDER — SODIUM CHLORIDE, SODIUM LACTATE, POTASSIUM CHLORIDE, CALCIUM CHLORIDE 600; 310; 30; 20 MG/100ML; MG/100ML; MG/100ML; MG/100ML
75 INJECTION, SOLUTION INTRAVENOUS CONTINUOUS
Status: ACTIVE | OUTPATIENT
Start: 2024-01-12 | End: 2024-01-13

## 2024-01-12 RX ORDER — DROPERIDOL 2.5 MG/ML
1.25 INJECTION, SOLUTION INTRAMUSCULAR; INTRAVENOUS ONCE
Status: DISCONTINUED | OUTPATIENT
Start: 2024-01-12 | End: 2024-01-12

## 2024-01-12 RX ORDER — ATORVASTATIN CALCIUM 10 MG/1
10 TABLET, FILM COATED ORAL
Status: DISCONTINUED | OUTPATIENT
Start: 2024-01-12 | End: 2024-01-17 | Stop reason: HOSPADM

## 2024-01-12 RX ORDER — DICYCLOMINE HYDROCHLORIDE 10 MG/1
10 CAPSULE ORAL 4 TIMES DAILY
Status: DISCONTINUED | OUTPATIENT
Start: 2024-01-12 | End: 2024-01-17 | Stop reason: HOSPADM

## 2024-01-12 RX ORDER — ONDANSETRON HYDROCHLORIDE 2 MG/ML
4 INJECTION, SOLUTION INTRAVENOUS ONCE
Status: COMPLETED | OUTPATIENT
Start: 2024-01-12 | End: 2024-01-12

## 2024-01-12 RX ORDER — DEXTROSE 50 % IN WATER (D50W) INTRAVENOUS SYRINGE
25
Status: DISCONTINUED | OUTPATIENT
Start: 2024-01-12 | End: 2024-01-17 | Stop reason: HOSPADM

## 2024-01-12 RX ORDER — FAMOTIDINE 20 MG/1
20 TABLET, FILM COATED ORAL
Status: DISCONTINUED | OUTPATIENT
Start: 2024-01-12 | End: 2024-01-17 | Stop reason: HOSPADM

## 2024-01-12 RX ORDER — POTASSIUM CHLORIDE 20 MEQ/1
40 TABLET, EXTENDED RELEASE ORAL ONCE
Status: COMPLETED | OUTPATIENT
Start: 2024-01-12 | End: 2024-01-12

## 2024-01-12 RX ORDER — FUROSEMIDE 40 MG/1
40 TABLET ORAL DAILY
Status: DISCONTINUED | OUTPATIENT
Start: 2024-01-13 | End: 2024-01-17 | Stop reason: HOSPADM

## 2024-01-12 RX ORDER — DEXTROSE MONOHYDRATE 100 MG/ML
0.3 INJECTION, SOLUTION INTRAVENOUS ONCE AS NEEDED
Status: DISCONTINUED | OUTPATIENT
Start: 2024-01-12 | End: 2024-01-17 | Stop reason: HOSPADM

## 2024-01-12 RX ORDER — ALUMINUM HYDROXIDE, MAGNESIUM HYDROXIDE, AND SIMETHICONE 1200; 120; 1200 MG/30ML; MG/30ML; MG/30ML
20 SUSPENSION ORAL ONCE
Status: COMPLETED | OUTPATIENT
Start: 2024-01-12 | End: 2024-01-12

## 2024-01-12 RX ORDER — TALC
3 POWDER (GRAM) TOPICAL NIGHTLY
Status: DISCONTINUED | OUTPATIENT
Start: 2024-01-12 | End: 2024-01-12

## 2024-01-12 RX ORDER — POLYETHYLENE GLYCOL 3350 17 G/17G
17 POWDER, FOR SOLUTION ORAL DAILY
Status: DISCONTINUED | OUTPATIENT
Start: 2024-01-12 | End: 2024-01-17 | Stop reason: HOSPADM

## 2024-01-12 RX ORDER — CHOLECALCIFEROL (VITAMIN D3) 25 MCG
2000 TABLET ORAL DAILY
Status: DISCONTINUED | OUTPATIENT
Start: 2024-01-12 | End: 2024-01-17 | Stop reason: HOSPADM

## 2024-01-12 RX ORDER — ROPINIROLE 1 MG/1
0.5 TABLET, FILM COATED ORAL NIGHTLY
Status: DISCONTINUED | OUTPATIENT
Start: 2024-01-12 | End: 2024-01-17 | Stop reason: HOSPADM

## 2024-01-12 RX ORDER — PROCHLORPERAZINE MALEATE 10 MG
10 TABLET ORAL EVERY 6 HOURS PRN
Status: DISCONTINUED | OUTPATIENT
Start: 2024-01-12 | End: 2024-01-17 | Stop reason: HOSPADM

## 2024-01-12 RX ORDER — TALC
6 POWDER (GRAM) TOPICAL NIGHTLY
Status: DISCONTINUED | OUTPATIENT
Start: 2024-01-12 | End: 2024-01-17 | Stop reason: HOSPADM

## 2024-01-12 RX ORDER — AMLODIPINE BESYLATE 10 MG/1
10 TABLET ORAL
Status: DISCONTINUED | OUTPATIENT
Start: 2024-01-13 | End: 2024-01-17 | Stop reason: HOSPADM

## 2024-01-12 RX ADMIN — PROPRANOLOL HYDROCHLORIDE 160 MG: 80 CAPSULE, EXTENDED RELEASE ORAL at 21:55

## 2024-01-12 RX ADMIN — ATORVASTATIN CALCIUM 10 MG: 10 TABLET, FILM COATED ORAL at 21:08

## 2024-01-12 RX ADMIN — PROCHLORPERAZINE EDISYLATE 10 MG: 5 INJECTION INTRAMUSCULAR; INTRAVENOUS at 08:05

## 2024-01-12 RX ADMIN — FAMOTIDINE 20 MG: 20 TABLET ORAL at 21:08

## 2024-01-12 RX ADMIN — SERTRALINE HYDROCHLORIDE 150 MG: 100 TABLET ORAL at 20:56

## 2024-01-12 RX ADMIN — SENNOSIDES AND DOCUSATE SODIUM 2 TABLET: 8.6; 5 TABLET ORAL at 20:57

## 2024-01-12 RX ADMIN — LORATADINE 10 MG: 10 TABLET ORAL at 20:56

## 2024-01-12 RX ADMIN — FENTANYL CITRATE 50 MCG: 50 INJECTION, SOLUTION INTRAMUSCULAR; INTRAVENOUS at 05:01

## 2024-01-12 RX ADMIN — DROPERIDOL 1.25 MG: 2.5 INJECTION, SOLUTION INTRAMUSCULAR; INTRAVENOUS at 03:22

## 2024-01-12 RX ADMIN — ONDANSETRON 4 MG: 2 INJECTION INTRAMUSCULAR; INTRAVENOUS at 09:57

## 2024-01-12 RX ADMIN — PROCHLORPERAZINE EDISYLATE 10 MG: 5 INJECTION INTRAMUSCULAR; INTRAVENOUS at 15:54

## 2024-01-12 RX ADMIN — DICYCLOMINE HYDROCHLORIDE 10 MG: 10 CAPSULE ORAL at 20:57

## 2024-01-12 RX ADMIN — HYDROMORPHONE HYDROCHLORIDE 0.2 MG: 0.2 INJECTION, SOLUTION INTRAMUSCULAR; INTRAVENOUS; SUBCUTANEOUS at 14:19

## 2024-01-12 RX ADMIN — POTASSIUM CHLORIDE 40 MEQ: 1500 TABLET, EXTENDED RELEASE ORAL at 12:31

## 2024-01-12 RX ADMIN — ONDANSETRON 4 MG: 2 INJECTION INTRAMUSCULAR; INTRAVENOUS at 02:08

## 2024-01-12 RX ADMIN — POLYETHYLENE GLYCOL 3350 17 G: 17 POWDER, FOR SOLUTION ORAL at 12:31

## 2024-01-12 RX ADMIN — SODIUM CHLORIDE 1000 ML: 9 INJECTION, SOLUTION INTRAVENOUS at 02:09

## 2024-01-12 RX ADMIN — INSULIN LISPRO 6 UNITS: 100 INJECTION, SOLUTION INTRAVENOUS; SUBCUTANEOUS at 20:58

## 2024-01-12 RX ADMIN — ALUMINUM HYDROXIDE, MAGNESIUM HYDROXIDE, AND DIMETHICONE 20 ML: 200; 20; 200 SUSPENSION ORAL at 11:34

## 2024-01-12 RX ADMIN — Medication 2000 UNITS: at 21:08

## 2024-01-12 RX ADMIN — SODIUM CHLORIDE, POTASSIUM CHLORIDE, SODIUM LACTATE AND CALCIUM CHLORIDE 75 ML/HR: 600; 310; 30; 20 INJECTION, SOLUTION INTRAVENOUS at 18:03

## 2024-01-12 RX ADMIN — MIRTAZAPINE 7.5 MG: 15 TABLET, FILM COATED ORAL at 20:58

## 2024-01-12 RX ADMIN — ROPINIROLE HYDROCHLORIDE 0.5 MG: 1 TABLET, FILM COATED ORAL at 20:56

## 2024-01-12 RX ADMIN — Medication 6 MG: at 20:56

## 2024-01-12 RX ADMIN — FENTANYL CITRATE 50 MCG: 50 INJECTION, SOLUTION INTRAMUSCULAR; INTRAVENOUS at 02:22

## 2024-01-12 SDOH — SOCIAL STABILITY: SOCIAL INSECURITY: DO YOU FEEL ANYONE HAS EXPLOITED OR TAKEN ADVANTAGE OF YOU FINANCIALLY OR OF YOUR PERSONAL PROPERTY?: NO

## 2024-01-12 SDOH — SOCIAL STABILITY: SOCIAL INSECURITY: ARE THERE ANY APPARENT SIGNS OF INJURIES/BEHAVIORS THAT COULD BE RELATED TO ABUSE/NEGLECT?: NO

## 2024-01-12 SDOH — SOCIAL STABILITY: SOCIAL INSECURITY: DO YOU FEEL UNSAFE GOING BACK TO THE PLACE WHERE YOU ARE LIVING?: NO

## 2024-01-12 SDOH — SOCIAL STABILITY: SOCIAL INSECURITY: HAS ANYONE EVER THREATENED TO HURT YOUR FAMILY OR YOUR PETS?: NO

## 2024-01-12 SDOH — SOCIAL STABILITY: SOCIAL INSECURITY: ABUSE: ADULT

## 2024-01-12 SDOH — SOCIAL STABILITY: SOCIAL INSECURITY: DOES ANYONE TRY TO KEEP YOU FROM HAVING/CONTACTING OTHER FRIENDS OR DOING THINGS OUTSIDE YOUR HOME?: NO

## 2024-01-12 SDOH — SOCIAL STABILITY: SOCIAL INSECURITY: HAVE YOU HAD THOUGHTS OF HARMING ANYONE ELSE?: YES

## 2024-01-12 SDOH — SOCIAL STABILITY: SOCIAL INSECURITY: WERE YOU ABLE TO COMPLETE ALL THE BEHAVIORAL HEALTH SCREENINGS?: YES

## 2024-01-12 SDOH — SOCIAL STABILITY: SOCIAL INSECURITY: ARE YOU OR HAVE YOU BEEN THREATENED OR ABUSED PHYSICALLY, EMOTIONALLY, OR SEXUALLY BY ANYONE?: NO

## 2024-01-12 ASSESSMENT — COGNITIVE AND FUNCTIONAL STATUS - GENERAL
DAILY ACTIVITIY SCORE: 24
PATIENT BASELINE BEDBOUND: NO
MOBILITY SCORE: 24
MOBILITY SCORE: 24
DAILY ACTIVITIY SCORE: 24

## 2024-01-12 ASSESSMENT — COLUMBIA-SUICIDE SEVERITY RATING SCALE - C-SSRS
1. IN THE PAST MONTH, HAVE YOU WISHED YOU WERE DEAD OR WISHED YOU COULD GO TO SLEEP AND NOT WAKE UP?: NO
2. HAVE YOU ACTUALLY HAD ANY THOUGHTS OF KILLING YOURSELF?: NO
6. HAVE YOU EVER DONE ANYTHING, STARTED TO DO ANYTHING, OR PREPARED TO DO ANYTHING TO END YOUR LIFE?: NO

## 2024-01-12 ASSESSMENT — LIFESTYLE VARIABLES
HAVE PEOPLE ANNOYED YOU BY CRITICIZING YOUR DRINKING: NO
AUDIT-C TOTAL SCORE: 0
AUDIT-C TOTAL SCORE: 0
REASON UNABLE TO ASSESS: NO
HAVE YOU EVER FELT YOU SHOULD CUT DOWN ON YOUR DRINKING: NO
HOW MANY STANDARD DRINKS CONTAINING ALCOHOL DO YOU HAVE ON A TYPICAL DAY: PATIENT DOES NOT DRINK
SKIP TO QUESTIONS 9-10: 1
EVER HAD A DRINK FIRST THING IN THE MORNING TO STEADY YOUR NERVES TO GET RID OF A HANGOVER: NO
EVER FELT BAD OR GUILTY ABOUT YOUR DRINKING: NO
HOW OFTEN DO YOU HAVE 6 OR MORE DRINKS ON ONE OCCASION: NEVER
HOW OFTEN DO YOU HAVE A DRINK CONTAINING ALCOHOL: NEVER

## 2024-01-12 ASSESSMENT — ACTIVITIES OF DAILY LIVING (ADL)
ADEQUATE_TO_COMPLETE_ADL: YES
HEARING - LEFT EAR: FUNCTIONAL
JUDGMENT_ADEQUATE_SAFELY_COMPLETE_DAILY_ACTIVITIES: YES
WALKS IN HOME: INDEPENDENT
GROOMING: INDEPENDENT
PATIENT'S MEMORY ADEQUATE TO SAFELY COMPLETE DAILY ACTIVITIES?: YES
HEARING - RIGHT EAR: FUNCTIONAL
DRESSING YOURSELF: INDEPENDENT
FEEDING YOURSELF: INDEPENDENT
TOILETING: INDEPENDENT
LACK_OF_TRANSPORTATION: NO
BATHING: INDEPENDENT

## 2024-01-12 ASSESSMENT — PAIN SCALES - GENERAL
PAINLEVEL_OUTOF10: 10 - WORST POSSIBLE PAIN
PAINLEVEL_OUTOF10: 0 - NO PAIN
PAINLEVEL_OUTOF10: 8
PAINLEVEL_OUTOF10: 8
PAINLEVEL_OUTOF10: 0 - NO PAIN

## 2024-01-12 ASSESSMENT — PATIENT HEALTH QUESTIONNAIRE - PHQ9
1. LITTLE INTEREST OR PLEASURE IN DOING THINGS: NOT AT ALL
SUM OF ALL RESPONSES TO PHQ9 QUESTIONS 1 & 2: 0
2. FEELING DOWN, DEPRESSED OR HOPELESS: NOT AT ALL

## 2024-01-12 ASSESSMENT — PAIN - FUNCTIONAL ASSESSMENT
PAIN_FUNCTIONAL_ASSESSMENT: 0-10

## 2024-01-12 ASSESSMENT — PAIN DESCRIPTION - LOCATION
LOCATION: ABDOMEN
LOCATION: ABDOMEN

## 2024-01-12 NOTE — ED PROVIDER NOTES
HPI   Chief Complaint   Patient presents with    Vomiting       This is a 53 years old male patient presented to the emergency department with a chief complaint of nausea, multiple episodes of vomiting.  He has history of end-stage renal disease and on hemodialysis Monday, Wednesday, and Friday.  He is diabetic.  He was in the emergency department yesterday and was admitted for intractable nausea and vomiting.  He was discharged from the hospital after controlling his symptoms.  He denies any chest pain, shortness of breath, cough, weakness, focal numbness, fever, chills, body aches or abdominal pain.    Review of system: As stated above in the HPI section.                          Ochoa Coma Scale Score: 15                  Patient History   Past Medical History:   Diagnosis Date    Encounter for other preprocedural examination 11/03/2016    Pre-op evaluation    Personal history of malignant neoplasm of thyroid     History of malignant neoplasm of thyroid    Personal history of other diseases of the circulatory system     History of hypertension    Personal history of other endocrine, nutritional and metabolic disease     History of diabetes mellitus     Past Surgical History:   Procedure Laterality Date    OTHER SURGICAL HISTORY  12/06/2018    Thyroidectomy    OTHER SURGICAL HISTORY  12/06/2018    Nasal polypectomy    OTHER SURGICAL HISTORY  12/06/2018    Appendectomy     No family history on file.  Social History     Tobacco Use    Smoking status: Never    Smokeless tobacco: Never   Substance Use Topics    Alcohol use: Yes    Drug use: Not Currently       Physical Exam   ED Triage Vitals [01/12/24 0029]   Temp Heart Rate Resp BP   36.4 °C (97.5 °F) 98 20 164/77      SpO2 Temp Source Heart Rate Source Patient Position   100 % Temporal Monitor Sitting      BP Location FiO2 (%)     Left arm --       Physical Exam  Vitals and nursing note reviewed.   Constitutional:       General: He is not in acute distress.      Appearance: He is well-developed.   HENT:      Head: Normocephalic and atraumatic.   Eyes:      Conjunctiva/sclera: Conjunctivae normal.   Cardiovascular:      Rate and Rhythm: Normal rate and regular rhythm.      Heart sounds: No murmur heard.  Pulmonary:      Effort: Pulmonary effort is normal. No respiratory distress.      Breath sounds: Normal breath sounds.   Abdominal:      Palpations: Abdomen is soft.      Tenderness: There is no abdominal tenderness.   Musculoskeletal:         General: No swelling.      Cervical back: Neck supple.   Skin:     General: Skin is warm and dry.      Capillary Refill: Capillary refill takes less than 2 seconds.   Neurological:      Mental Status: He is alert.   Psychiatric:         Mood and Affect: Mood normal.         ED Course & MDM   Diagnoses as of 01/12/24 0715   Intractable nausea and vomiting       Medical Decision Making  Patient seen and examined, he received 2 nausea medication, more than 2 pain medication and still in intractable nausea, vomiting, and abdominal discomfort.  Will admit the patient for intractable nausea and vomiting.    Discussed the case with Dr. Quan requested to try Compazine since it helped his intractable nausea and vomiting yesterday.  Compazine is ordered in the emergency department.  Will reassess the patient's symptoms including nausea, vomiting, abdominal pain after administering Compazine and if the patient remains symptomatic he will be admitted to the hospital service.  Will sign out the patient care at this point pending Compazine administration, reassessment of the patient's symptoms and disposition accordingly.        Procedure  Procedures     Ryan Benitez, DO  01/12/24 0620       Ryan Benitez, DO  01/12/24 0714       Ryan Benitez, DO  01/12/24 0715       Ryan Benitez, DO  01/12/24 0727

## 2024-01-12 NOTE — CONSULTS
[facility]  Consult Note           Reason for Consult:  ESRD management  Requesting Physician:  Dr. Gaitan    Chief Complaint:  N/V   History Obtained From:  patient, electronic medical record    History of Present Ilness:    53 y.o. male with history s/f ESRD on IHD, HTN, T2DM, recurrent N/V, remote h/o thyroid cancer who was recently discharged after being managed for N/V and presents hours later for the same. His symptoms had actually improved prior to discharge. He got while here so was back to regular schedule. Goes to [facility], under care of Dr. Aleman. [facility].     Past Medical History:    Past Medical History:   Diagnosis Date    Encounter for other preprocedural examination 11/03/2016    Pre-op evaluation    Personal history of malignant neoplasm of thyroid     History of malignant neoplasm of thyroid    Personal history of other diseases of the circulatory system     History of hypertension    Personal history of other endocrine, nutritional and metabolic disease     History of diabetes mellitus       Past Surgical History:    Past Surgical History:   Procedure Laterality Date    OTHER SURGICAL HISTORY  12/06/2018    Thyroidectomy    OTHER SURGICAL HISTORY  12/06/2018    Nasal polypectomy    OTHER SURGICAL HISTORY  12/06/2018    Appendectomy       Home Medications:    Current Facility-Administered Medications on File Prior to Encounter   Medication Dose Route Frequency Provider Last Rate Last Admin    [DISCONTINUED] albumin human 25 % solution 12.5 g  12.5 g intravenous PRN Jaylan Aleman MD        [DISCONTINUED] albumin human 25 % solution 12.5 g  12.5 g intravenous PRN Jaylan Aleman MD        [DISCONTINUED] dextrose 10 % in water (D10W) infusion  0.3 g/kg/hr intravenous Once PRN Arnie Don MD        [DISCONTINUED] dextrose 50 % injection 25 g  25 g intravenous q15 min PRN Arnie Don MD        [DISCONTINUED] glucagon (Glucagen) injection 1 mg  1  mg intramuscular q15 min PRN Arnie Don MD        [DISCONTINUED] heparin (porcine) injection 3,000 Units  3,000 Units intravenous Once Jaylan Aleman MD        [DISCONTINUED] heparin (porcine) injection 5,000 Units  5,000 Units subcutaneous q8h [facility] Arnie Don MD   5,000 Units at 01/11/24 0529    [DISCONTINUED] heparin 1,000 unit/mL injection 3,000 Units  3,000 Units intra-catheter After Dialysis Jaylan Aleman MD        [DISCONTINUED] heparin 1,000 unit/mL injection 3,000 Units  3,000 Units intra-catheter After Dialysis Jaylan Aleman MD   3,000 Units at 01/10/24 1525    [DISCONTINUED] insulin lispro (HumaLOG) injection 0-10 Units  0-10 Units subcutaneous TID with meals Arnie Don MD        [DISCONTINUED] ondansetron (Zofran) injection 4 mg  4 mg intravenous q6h PRN Hammad Patrick, DO   4 mg at 01/10/24 2050    [DISCONTINUED] prochlorperazine (Compazine) injection 10 mg  10 mg intravenous q6h PRN Hammad Patrick DO   10 mg at 01/10/24 2230     Current Outpatient Medications on File Prior to Encounter   Medication Sig Dispense Refill    amLODIPine (Norvasc) 10 mg tablet Take 1 tablet (10 mg) by mouth once daily.      atorvastatin (Lipitor) 10 mg tablet Take 1 tablet (10 mg) by mouth once a day on Monday, Wednesday, and Friday. evening      B complex-vitamin C-folic acid (Inna-Harsh) 0.8 mg tablet Take 1 tablet by mouth once daily.      cetirizine (ZyrTEC) 5 mg tablet Take 1 tablet (5 mg) by mouth once daily.      cholecalciferol (Vitamin D-3) 50 MCG (2000 UT) tablet Take 1 tablet (50 mcg) by mouth once daily.      cinacalcet HCl (SENSIPAR ORAL) Take 30 mg by mouth once a day on Monday, Wednesday, and Friday. After dialysis      famotidine (Pepcid) 20 mg tablet Take 1 tablet (20 mg) by mouth once a day on Monday, Wednesday, and Friday.      furosemide (Lasix) 40 mg tablet Take 1 tablet (40 mg) by mouth once daily.      hydrOXYzine pamoate (Vistaril) 50 mg capsule Take 1 capsule (50 mg) by mouth as needed at bedtime  (sleep).      insulin aspart (NovoLOG Flexpen U-100 Insulin) 100 unit/mL (3 mL) pen Inject 1-5 Units under the skin 3 times a day with meals. 0-149 = ZERO UNITS; -199 = GIVE 1 UNIT; 200-249 = 2 UNITS; 250-299 = 3 UNITS; 300-349 = 4 UNITS; GREATER THAN 349 = 5 UNITS      insulin glargine-yfgn 100 unit/mL (3 mL) Pen Inject 35 Units under the skin once daily at bedtime.      levothyroxine (Synthroid) 175 mcg tablet Take 1 tablet (175 mcg) by mouth once daily.      LORazepam (Ativan) 0.5 mg tablet Take 1 tablet (0.5 mg) by mouth as needed at bedtime for anxiety.      melatonin (Melatin) 3 mg tablet Take 2 tablets (6 mg) by mouth once daily at bedtime.      mirtazapine (Remeron) 15 mg tablet Take 0.5 tablets (7.5 mg) by mouth once daily at bedtime.      polyethylene glycol (Glycolax, Miralax) 17 gram packet Take 17 g by mouth once daily.      propranolol LA (Inderal LA) 160 mg 24 hr capsule Take 1 capsule (160 mg) by mouth once daily.      rOPINIRole (Requip) 0.5 mg tablet Take 1 tablet (0.5 mg) by mouth once daily at bedtime.      sennosides-docusate sodium (Dinah-Colace) 8.6-50 mg tablet Take 2 tablets by mouth once daily.      sertraline (Zoloft) 100 mg tablet Take 1.5 tablets (150 mg) by mouth once daily.      sucroferric oxyhydroxide (Velphoro) 500 mg tablet,chewable chewable tablet Chew 2 tablets (1,000 mg) 3 times a day with meals.      [DISCONTINUED] amitriptyline (Elavil) 25 mg tablet       [DISCONTINUED] dexlansoprazole (Dexilant) 60 mg DR capsule Take by mouth.      [DISCONTINUED] insulin glargine (Lantus) 100 unit/mL injection Inject 20 Units under the skin.      [DISCONTINUED] lisinopril 20 mg tablet       [DISCONTINUED] losartan (Cozaar) 100 mg tablet Take 1 tablet (100 mg) by mouth once daily.         Allergies:  Aspirin, Acetaminophen, Nsaids (non-steroidal anti-inflammatory drug), Omeprazole, Penicillins, Simvastatin, and Topiramate    Social History:    Social History     Socioeconomic History     Marital status:      Spouse name: Not on file    Number of children: Not on file    Years of education: Not on file    Highest education level: Not on file   Occupational History    Not on file   Tobacco Use    Smoking status: Never    Smokeless tobacco: Never   Substance and Sexual Activity    Alcohol use: Yes    Drug use: Not Currently    Sexual activity: Not on file   Other Topics Concern    Not on file   Social History Narrative    Not on file     Social Determinants of Health     Financial Resource Strain: Low Risk  (1/10/2024)    Overall Financial Resource Strain (CARDIA)     Difficulty of Paying Living Expenses: Not hard at all   Food Insecurity: Not on file   Transportation Needs: No Transportation Needs (1/10/2024)    PRAPARE - Transportation     Lack of Transportation (Medical): No     Lack of Transportation (Non-Medical): No   Physical Activity: Not on file   Stress: Not on file   Social Connections: Not on file   Intimate Partner Violence: Not on file   Housing Stability: Low Risk  (1/10/2024)    Housing Stability Vital Sign     Unable to Pay for Housing in the Last Year: No     Number of Places Lived in the Last Year: 1     Unstable Housing in the Last Year: No       Family History:   No family history on file.    Review of Systems:   Positives in bold  Constitutional: fever, chills, fatigue, malaise   HENT:  rhinorrhea, sinus pain, sore throat, epistaxis    Eyes:  photophobia, visual disturbance, eye redness  Respiratory: shortness of breath, cough, hemoptysis    Cardiovascular: chest pain, palpitations, orthopnea, leg swelling   Gastrointestinal: abdominal pain, nausea, vomiting, diarrhea, constipation   Endocrine: polydipsia, polyphagia, cold intolerance, heat intolerance  Genitourinary: dysuria, flank pain, frequency, urgency   Hematologic: easy bruising, easy bleeding  Musculoskeletal: back pain, neck pain, myalgias, arthalgias  Neurological: syncope, lightheadedness, dizziness, seizures,  "tremors, weakness  Psychiatric/Behavioral: anxiety, depression, hallucinations  Skin: rash, itching    Physical exam:   Constitutional:  VITALS:  BP (!) 145/49   Pulse 80   Temp 36.4 °C (97.5 °F) (Temporal)   Resp 18   Ht 1.778 m (5' 10\")   Wt 94.3 kg (208 lb 0.1 oz)   SpO2 93%   BMI 29.85 kg/m²     General: alert, in no apparent distress  HEENT: normocephalic, atraumatic, anicteric  Neck: supple, no mass  Lungs: non-labored respirations, clear to auscultation bilaterally  Heart: regular rate and rhythm, no murmurs or rubs  Abdomen: soft, non-tender, non-distended  MSK: no joint swelling or tenderness  Ext: no cyanosis, no peripheral edema  Neuro: alert and oriented, no gross abnormalities  Psych: normal mood and affect    Data/  CBC:   Lab Results   Component Value Date    WBC 11.4 (H) 01/12/2024    RBC 4.85 01/12/2024    HGB 14.7 01/12/2024    HCT 44.5 01/12/2024    MCV 92 01/12/2024    MCH 30.3 01/12/2024    MCHC 33.0 01/12/2024    RDW 12.7 01/12/2024     01/12/2024     BMP:    Lab Results   Component Value Date     01/12/2024    K 3.4 (L) 01/12/2024    CL 92 (L) 01/12/2024    CO2 24 01/12/2024    BUN 36 (H) 01/12/2024    CREATININE 6.61 (H) 01/12/2024    CALCIUM 10.7 (H) 01/12/2024    GLUCOSE 124 (H) 01/12/2024       Assessment:  53 y.o. male with history s/f ESRD on IHD, HTN, T2DM, recurrent N/V, remote h/o thyroid cancer who was recently discharged after being managed for N/V and presents hours later for the same.    ESRD on IHD MWF via [facility], pt of Dr. Aleman, EDW 96 kg   Hypertensive urgency: improved  Recurrent N/V  Hypokalemia   Hypercalcemia   Lactic acidosis: improved   Anemia of renal disease  Secondary renal hyperparathyroidism     Plan:  - HD ordered however will likely not be able to get it until much later tonight, as pt presented volume depleted w/ lactic acidosis ok to hold of on HD and plan for rx 1st thing in the AM  - no indication for [facility]A     Thank you for the " consultation. Will continue to follow  Please do not hesitate to call with questions.    Viktor Alvarez MD

## 2024-01-13 ENCOUNTER — APPOINTMENT (OUTPATIENT)
Dept: DIALYSIS | Facility: HOSPITAL | Age: 54
End: 2024-01-13
Payer: OTHER GOVERNMENT

## 2024-01-13 ENCOUNTER — APPOINTMENT (OUTPATIENT)
Dept: RADIOLOGY | Facility: HOSPITAL | Age: 54
DRG: 073 | End: 2024-01-13
Payer: OTHER GOVERNMENT

## 2024-01-13 LAB
ALBUMIN SERPL BCP-MCNC: 3.9 G/DL (ref 3.4–5)
ANION GAP SERPL CALC-SCNC: 17 MMOL/L (ref 10–20)
ANION GAP SERPL CALC-SCNC: 17 MMOL/L (ref 10–20)
BUN SERPL-MCNC: 23 MG/DL (ref 6–23)
BUN SERPL-MCNC: 47 MG/DL (ref 6–23)
CALCIUM SERPL-MCNC: 9.1 MG/DL (ref 8.6–10.3)
CALCIUM SERPL-MCNC: 9.7 MG/DL (ref 8.6–10.3)
CHLORIDE SERPL-SCNC: 97 MMOL/L (ref 98–107)
CHLORIDE SERPL-SCNC: 98 MMOL/L (ref 98–107)
CO2 SERPL-SCNC: 25 MMOL/L (ref 21–32)
CO2 SERPL-SCNC: 26 MMOL/L (ref 21–32)
CREAT SERPL-MCNC: 5.54 MG/DL (ref 0.5–1.3)
CREAT SERPL-MCNC: 8.88 MG/DL (ref 0.5–1.3)
EGFRCR SERPLBLD CKD-EPI 2021: 12 ML/MIN/1.73M*2
EGFRCR SERPLBLD CKD-EPI 2021: 7 ML/MIN/1.73M*2
ERYTHROCYTE [DISTWIDTH] IN BLOOD BY AUTOMATED COUNT: 12.8 % (ref 11.5–14.5)
ERYTHROCYTE [DISTWIDTH] IN BLOOD BY AUTOMATED COUNT: 12.9 % (ref 11.5–14.5)
GLUCOSE BLD MANUAL STRIP-MCNC: 137 MG/DL (ref 74–99)
GLUCOSE BLD MANUAL STRIP-MCNC: 199 MG/DL (ref 74–99)
GLUCOSE BLD MANUAL STRIP-MCNC: 200 MG/DL (ref 74–99)
GLUCOSE SERPL-MCNC: 120 MG/DL (ref 74–99)
GLUCOSE SERPL-MCNC: 193 MG/DL (ref 74–99)
HCT VFR BLD AUTO: 39.3 % (ref 41–52)
HCT VFR BLD AUTO: 43.5 % (ref 41–52)
HGB BLD-MCNC: 12.7 G/DL (ref 13.5–17.5)
HGB BLD-MCNC: 14.1 G/DL (ref 13.5–17.5)
MAGNESIUM SERPL-MCNC: 2.54 MG/DL (ref 1.6–2.4)
MCH RBC QN AUTO: 30.5 PG (ref 26–34)
MCH RBC QN AUTO: 30.9 PG (ref 26–34)
MCHC RBC AUTO-ENTMCNC: 32.3 G/DL (ref 32–36)
MCHC RBC AUTO-ENTMCNC: 32.4 G/DL (ref 32–36)
MCV RBC AUTO: 95 FL (ref 80–100)
MCV RBC AUTO: 95 FL (ref 80–100)
NRBC BLD-RTO: 0 /100 WBCS (ref 0–0)
NRBC BLD-RTO: 0 /100 WBCS (ref 0–0)
PHOSPHATE SERPL-MCNC: 3.5 MG/DL (ref 2.5–4.9)
PLATELET # BLD AUTO: 217 X10*3/UL (ref 150–450)
PLATELET # BLD AUTO: 218 X10*3/UL (ref 150–450)
POTASSIUM SERPL-SCNC: 4.1 MMOL/L (ref 3.5–5.3)
POTASSIUM SERPL-SCNC: 4.3 MMOL/L (ref 3.5–5.3)
RBC # BLD AUTO: 4.16 X10*6/UL (ref 4.5–5.9)
RBC # BLD AUTO: 4.56 X10*6/UL (ref 4.5–5.9)
SODIUM SERPL-SCNC: 135 MMOL/L (ref 136–145)
SODIUM SERPL-SCNC: 137 MMOL/L (ref 136–145)
WBC # BLD AUTO: 7.7 X10*3/UL (ref 4.4–11.3)
WBC # BLD AUTO: 8.4 X10*3/UL (ref 4.4–11.3)

## 2024-01-13 PROCEDURE — 8010000001 HC DIALYSIS - HEMODIALYSIS PER DAY

## 2024-01-13 PROCEDURE — 2500000001 HC RX 250 WO HCPCS SELF ADMINISTERED DRUGS (ALT 637 FOR MEDICARE OP): Performed by: INTERNAL MEDICINE

## 2024-01-13 PROCEDURE — 80069 RENAL FUNCTION PANEL: CPT | Performed by: INTERNAL MEDICINE

## 2024-01-13 PROCEDURE — 2500000004 HC RX 250 GENERAL PHARMACY W/ HCPCS (ALT 636 FOR OP/ED): Performed by: INTERNAL MEDICINE

## 2024-01-13 PROCEDURE — 74176 CT ABD & PELVIS W/O CONTRAST: CPT

## 2024-01-13 PROCEDURE — 36415 COLL VENOUS BLD VENIPUNCTURE: CPT | Performed by: INTERNAL MEDICINE

## 2024-01-13 PROCEDURE — 99232 SBSQ HOSP IP/OBS MODERATE 35: CPT | Performed by: INTERNAL MEDICINE

## 2024-01-13 PROCEDURE — 83735 ASSAY OF MAGNESIUM: CPT | Performed by: INTERNAL MEDICINE

## 2024-01-13 PROCEDURE — 2500000004 HC RX 250 GENERAL PHARMACY W/ HCPCS (ALT 636 FOR OP/ED): Performed by: STUDENT IN AN ORGANIZED HEALTH CARE EDUCATION/TRAINING PROGRAM

## 2024-01-13 PROCEDURE — 74176 CT ABD & PELVIS W/O CONTRAST: CPT | Performed by: RADIOLOGY

## 2024-01-13 PROCEDURE — G0378 HOSPITAL OBSERVATION PER HR: HCPCS

## 2024-01-13 PROCEDURE — 85027 COMPLETE CBC AUTOMATED: CPT | Performed by: INTERNAL MEDICINE

## 2024-01-13 PROCEDURE — 2500000005 HC RX 250 GENERAL PHARMACY W/O HCPCS: Performed by: INTERNAL MEDICINE

## 2024-01-13 PROCEDURE — 80048 BASIC METABOLIC PNL TOTAL CA: CPT | Mod: CCI | Performed by: INTERNAL MEDICINE

## 2024-01-13 PROCEDURE — 82947 ASSAY GLUCOSE BLOOD QUANT: CPT

## 2024-01-13 PROCEDURE — 5A1D70Z PERFORMANCE OF URINARY FILTRATION, INTERMITTENT, LESS THAN 6 HOURS PER DAY: ICD-10-PCS | Performed by: INTERNAL MEDICINE

## 2024-01-13 PROCEDURE — 2500000002 HC RX 250 W HCPCS SELF ADMINISTERED DRUGS (ALT 637 FOR MEDICARE OP, ALT 636 FOR OP/ED): Performed by: INTERNAL MEDICINE

## 2024-01-13 PROCEDURE — 94760 N-INVAS EAR/PLS OXIMETRY 1: CPT

## 2024-01-13 RX ORDER — LIDOCAINE HYDROCHLORIDE 20 MG/ML
5 SOLUTION OROPHARYNGEAL EVERY 6 HOURS
Status: DISCONTINUED | OUTPATIENT
Start: 2024-01-13 | End: 2024-01-17 | Stop reason: HOSPADM

## 2024-01-13 RX ORDER — HEPARIN SODIUM 1000 [USP'U]/ML
4000 INJECTION, SOLUTION INTRAVENOUS; SUBCUTANEOUS
Status: DISCONTINUED | OUTPATIENT
Start: 2024-01-13 | End: 2024-01-17 | Stop reason: HOSPADM

## 2024-01-13 RX ORDER — ALUMINUM HYDROXIDE, MAGNESIUM HYDROXIDE, AND SIMETHICONE 1200; 120; 1200 MG/30ML; MG/30ML; MG/30ML
10 SUSPENSION ORAL EVERY 6 HOURS
Status: DISCONTINUED | OUTPATIENT
Start: 2024-01-13 | End: 2024-01-17 | Stop reason: HOSPADM

## 2024-01-13 RX ORDER — HEPARIN SODIUM 1000 [USP'U]/ML
2000 INJECTION, SOLUTION INTRAVENOUS; SUBCUTANEOUS
Status: DISCONTINUED | OUTPATIENT
Start: 2024-01-13 | End: 2024-01-17 | Stop reason: HOSPADM

## 2024-01-13 RX ORDER — CINACALCET 30 MG/1
30 TABLET, FILM COATED ORAL ONCE
Status: DISCONTINUED | OUTPATIENT
Start: 2024-01-13 | End: 2024-01-15

## 2024-01-13 RX ORDER — CALCIUM CARBONATE 200(500)MG
500 TABLET,CHEWABLE ORAL DAILY
Status: COMPLETED | OUTPATIENT
Start: 2024-01-13 | End: 2024-01-14

## 2024-01-13 RX ORDER — ACETAMINOPHEN 325 MG/1
650 TABLET ORAL EVERY 6 HOURS PRN
Status: DISCONTINUED | OUTPATIENT
Start: 2024-01-13 | End: 2024-01-17 | Stop reason: HOSPADM

## 2024-01-13 RX ADMIN — ALUMINUM HYDROXIDE, MAGNESIUM HYDROXIDE, AND DIMETHICONE 10 ML: 200; 20; 200 SUSPENSION ORAL at 22:48

## 2024-01-13 RX ADMIN — FUROSEMIDE 40 MG: 40 TABLET ORAL at 09:00

## 2024-01-13 RX ADMIN — PANTOPRAZOLE SODIUM 40 MG: 40 TABLET, DELAYED RELEASE ORAL at 06:05

## 2024-01-13 RX ADMIN — HEPARIN SODIUM 4000 UNITS: 1000 INJECTION INTRAVENOUS; SUBCUTANEOUS at 10:00

## 2024-01-13 RX ADMIN — POLYETHYLENE GLYCOL 3350 17 G: 17 POWDER, FOR SOLUTION ORAL at 08:22

## 2024-01-13 RX ADMIN — Medication 6 MG: at 20:54

## 2024-01-13 RX ADMIN — ALUMINUM HYDROXIDE, MAGNESIUM HYDROXIDE, AND DIMETHICONE 10 ML: 200; 20; 200 SUSPENSION ORAL at 17:58

## 2024-01-13 RX ADMIN — SODIUM CHLORIDE, POTASSIUM CHLORIDE, SODIUM LACTATE AND CALCIUM CHLORIDE 75 ML/HR: 600; 310; 30; 20 INJECTION, SOLUTION INTRAVENOUS at 06:05

## 2024-01-13 RX ADMIN — DICYCLOMINE HYDROCHLORIDE 10 MG: 10 CAPSULE ORAL at 21:00

## 2024-01-13 RX ADMIN — PROCHLORPERAZINE EDISYLATE 10 MG: 5 INJECTION INTRAMUSCULAR; INTRAVENOUS at 16:28

## 2024-01-13 RX ADMIN — DIPHENHYDRAMINE HYDROCHLORIDE AND LIDOCAINE HYDROCHLORIDE AND ALUMINUM HYDROXIDE AND MAGNESIUM HYDRO 10 ML: KIT at 11:20

## 2024-01-13 RX ADMIN — DICYCLOMINE HYDROCHLORIDE 10 MG: 10 CAPSULE ORAL at 16:28

## 2024-01-13 RX ADMIN — PROPRANOLOL HYDROCHLORIDE 160 MG: 80 CAPSULE, EXTENDED RELEASE ORAL at 09:00

## 2024-01-13 RX ADMIN — DICYCLOMINE HYDROCHLORIDE 10 MG: 10 CAPSULE ORAL at 06:05

## 2024-01-13 RX ADMIN — INSULIN LISPRO 2 UNITS: 100 INJECTION, SOLUTION INTRAVENOUS; SUBCUTANEOUS at 20:58

## 2024-01-13 RX ADMIN — ROPINIROLE HYDROCHLORIDE 0.5 MG: 1 TABLET, FILM COATED ORAL at 20:54

## 2024-01-13 RX ADMIN — CALCIUM CARBONATE (ANTACID) CHEW TAB 500 MG 500 MG: 500 CHEW TAB at 20:53

## 2024-01-13 RX ADMIN — DICYCLOMINE HYDROCHLORIDE 10 MG: 10 CAPSULE ORAL at 14:03

## 2024-01-13 RX ADMIN — LORATADINE 10 MG: 10 TABLET ORAL at 20:54

## 2024-01-13 RX ADMIN — PROCHLORPERAZINE EDISYLATE 10 MG: 5 INJECTION INTRAMUSCULAR; INTRAVENOUS at 22:49

## 2024-01-13 RX ADMIN — SENNOSIDES AND DOCUSATE SODIUM 2 TABLET: 8.6; 5 TABLET ORAL at 20:56

## 2024-01-13 RX ADMIN — PROCHLORPERAZINE EDISYLATE 10 MG: 5 INJECTION INTRAMUSCULAR; INTRAVENOUS at 10:13

## 2024-01-13 RX ADMIN — SERTRALINE HYDROCHLORIDE 150 MG: 100 TABLET ORAL at 20:54

## 2024-01-13 RX ADMIN — Medication 2000 UNITS: at 21:12

## 2024-01-13 RX ADMIN — CINACALCET 30 MG: 30 TABLET, FILM COATED ORAL at 16:28

## 2024-01-13 RX ADMIN — LIDOCAINE HYDROCHLORIDE 5 ML: 20 SOLUTION ORAL; TOPICAL at 17:30

## 2024-01-13 RX ADMIN — LEVOTHYROXINE SODIUM 175 MCG: 175 TABLET ORAL at 08:25

## 2024-01-13 RX ADMIN — AMLODIPINE BESYLATE 10 MG: 10 TABLET ORAL at 09:00

## 2024-01-13 RX ADMIN — MIRTAZAPINE 7.5 MG: 15 TABLET, FILM COATED ORAL at 20:54

## 2024-01-13 ASSESSMENT — PAIN SCALES - GENERAL
PAINLEVEL_OUTOF10: 0 - NO PAIN

## 2024-01-13 ASSESSMENT — COGNITIVE AND FUNCTIONAL STATUS - GENERAL
DAILY ACTIVITIY SCORE: 24
MOBILITY SCORE: 24
MOBILITY SCORE: 24
DAILY ACTIVITIY SCORE: 24

## 2024-01-13 ASSESSMENT — PAIN - FUNCTIONAL ASSESSMENT
PAIN_FUNCTIONAL_ASSESSMENT: 0-10
PAIN_FUNCTIONAL_ASSESSMENT: NO/DENIES PAIN

## 2024-01-13 NOTE — CARE PLAN
The patient's goals for the shift include  remain free from nausea this shift    The clinical goals for the shift include remain free from nausea this shift      Problem: Diabetes  Goal: Maintain electrolyte levels within acceptable range throughout shift  Outcome: Progressing  Goal: Maintain glucose levels >70mg/dl to <250mg/dl throughout shift  Outcome: Progressing     Problem: Pain - Adult  Goal: Verbalizes/displays adequate comfort level or baseline comfort level  Outcome: Progressing     Problem: Safety - Adult  Goal: Free from fall injury  Outcome: Progressing     Problem: Discharge Planning  Goal: Discharge to home or other facility with appropriate resources  Outcome: Progressing     Problem: Chronic Conditions and Co-morbidities  Goal: Patient's chronic conditions and co-morbidity symptoms are monitored and maintained or improved  Outcome: Progressing     Problem: ESRD: Dialysis, CAPD  Goal: Electrolytes restored to baseline  Outcome: Progressing  Goal: Fatigue of chronic illness managed  Outcome: Progressing  Goal: Follows dialysis treatment plan  Outcome: Progressing

## 2024-01-13 NOTE — H&P
HISTORY AND PHYSICAL    History Of Present Illness  Duane Adams is a 53 y.o. male presenting with nausea and vomiting, patient was discharged yesterday after he improved however after 2 hours he starting having the same symptoms and came back to the ED, reported retching and abdominal pain after vomiting, reported heartburn along with that, no fever no chills, patient had dialysis yesterday and scheduled for dialysis today too.  Reported history of cyclic vomiting syndrome, reported no bowel movement for 3 days.    On arrival to the ED vital signs are stable, labs reviewed and showed alkalemia, creatinine 6.6 patient on dialysis, lactic acid 5.5 received 1 L of fluid in the ED came down to 1.7, lipase normal at 53, mild leukocytosis 11.4, COVID and flu was negative, CT scan from 2 days was reviewed no acute events, was receiving fentanyl and Zofran in the ED without improvement, received Compazine as he responded to that last admission admitted as he is not tolerating oral intake, admitted for intractable nausea and vomiting      Past Medical History  He has a past medical history of Encounter for other preprocedural examination (11/03/2016), Personal history of malignant neoplasm of thyroid, Personal history of other diseases of the circulatory system, and Personal history of other endocrine, nutritional and metabolic disease.    Surgical History  He has a past surgical history that includes Other surgical history (12/06/2018); Other surgical history (12/06/2018); and Other surgical history (12/06/2018).     Social History  He reports that he has never smoked. He has never used smokeless tobacco. He reports current alcohol use. He reports that he does not currently use drugs.    Family History  No family history on file.     Allergies  Aspirin, Acetaminophen, Nsaids (non-steroidal anti-inflammatory drug), Omeprazole, Penicillins, Simvastatin, and Topiramate    Review of Systems   ROS: 12 systems reviewed and  "negative except per HPI above     Physical Exam by System:     Constitutional awake/alert/oriented x3, mild distress, alert and cooperative   ENMT: mucous membranes moist   Head/Neck: Neck supple   Respiratory/Thorax: Patent airways, CTAB.   Cardiovascular: Regular, rate and rhythm, no murmurs, normal S 1and S 2   Gastrointestinal: Nondistended, soft, non-tender, +BS,.   Musculoskeletal: ROM intact, no joint swelling, normal strength   Extremities: normal extremities, no edema   Neurological: alert and oriented x3, intact senses, motor, response and reflexes, normal strength       Last Recorded Vitals  Blood pressure (!) 195/86, pulse 80, temperature 36.2 °C (97.2 °F), temperature source Temporal, resp. rate 16, height 1.778 m (5' 10\"), weight 94.3 kg (208 lb 0.1 oz), SpO2 99 %.    Relevant Results  Results for orders placed or performed during the hospital encounter of 01/12/24 (from the past 24 hour(s))   CBC with Differential   Result Value Ref Range    WBC 11.4 (H) 4.4 - 11.3 x10*3/uL    nRBC 0.0 0.0 - 0.0 /100 WBCs    RBC 4.85 4.50 - 5.90 x10*6/uL    Hemoglobin 14.7 13.5 - 17.5 g/dL    Hematocrit 44.5 41.0 - 52.0 %    MCV 92 80 - 100 fL    MCH 30.3 26.0 - 34.0 pg    MCHC 33.0 32.0 - 36.0 g/dL    RDW 12.7 11.5 - 14.5 %    Platelets 258 150 - 450 x10*3/uL    Neutrophils % 73.1 40.0 - 80.0 %    Immature Granulocytes %, Automated 0.3 0.0 - 0.9 %    Lymphocytes % 15.0 13.0 - 44.0 %    Monocytes % 10.0 2.0 - 10.0 %    Eosinophils % 1.1 0.0 - 6.0 %    Basophils % 0.5 0.0 - 2.0 %    Neutrophils Absolute 8.30 (H) 1.20 - 7.70 x10*3/uL    Immature Granulocytes Absolute, Automated 0.03 0.00 - 0.70 x10*3/uL    Lymphocytes Absolute 1.71 1.20 - 4.80 x10*3/uL    Monocytes Absolute 1.14 (H) 0.10 - 1.00 x10*3/uL    Eosinophils Absolute 0.13 0.00 - 0.70 x10*3/uL    Basophils Absolute 0.06 0.00 - 0.10 x10*3/uL   Comprehensive Metabolic Panel   Result Value Ref Range    Glucose 124 (H) 74 - 99 mg/dL    Sodium 137 136 - 145 mmol/L "    Potassium 3.4 (L) 3.5 - 5.3 mmol/L    Chloride 92 (L) 98 - 107 mmol/L    Bicarbonate 24 21 - 32 mmol/L    Anion Gap 24 (H) 10 - 20 mmol/L    Urea Nitrogen 36 (H) 6 - 23 mg/dL    Creatinine 6.61 (H) 0.50 - 1.30 mg/dL    eGFR 9 (L) >60 mL/min/1.73m*2    Calcium 10.7 (H) 8.6 - 10.3 mg/dL    Albumin 4.8 3.4 - 5.0 g/dL    Alkaline Phosphatase 75 33 - 120 U/L    Total Protein 8.5 (H) 6.4 - 8.2 g/dL    AST 28 9 - 39 U/L    Bilirubin, Total 0.8 0.0 - 1.2 mg/dL    ALT 22 10 - 52 U/L   Lipase   Result Value Ref Range    Lipase 53 9 - 82 U/L   Magnesium   Result Value Ref Range    Magnesium 2.22 1.60 - 2.40 mg/dL   Lactate   Result Value Ref Range    Lactate 5.5 (HH) 0.4 - 2.0 mmol/L   Sars-CoV-2 and Influenza A/B PCR   Result Value Ref Range    Flu A Result Not Detected Not Detected    Flu B Result Not Detected Not Detected    Coronavirus 2019, PCR Not Detected Not Detected   RSV PCR   Result Value Ref Range    RSV PCR Not Detected Not Detected   Lactate   Result Value Ref Range    Lactate 1.7 0.4 - 2.0 mmol/L   POCT GLUCOSE   Result Value Ref Range    POCT Glucose 191 (H) 74 - 99 mg/dL      Scheduled medications  dicyclomine, 10 mg, oral, 4x daily  melatonin, 3 mg, oral, Nightly  polyethylene glycol, 17 g, oral, Daily      Continuous medications  lactated Ringer's, 75 mL/hr, Last Rate: 75 mL/hr (01/12/24 1803)      PRN medications  PRN medications: lidocaine-diphenhydraMINE-Maalox 1:1:1, prochlorperazine **OR** prochlorperazine **OR** prochlorperazine       ECG 12 lead    Result Date: 1/12/2024  Normal sinus rhythm Normal ECG When compared with ECG of 20-JAN-2022 10:20, No significant change was found Confirmed by Aakash Levy ([id number]) on 1/12/2024 4:17:41 PM    CT abdomen pelvis w IV contrast    Result Date: 1/10/2024  Interpreted By:  Schoenberger, Joseph, STUDY: CT ABDOMEN PELVIS W IV CONTRAST;  1/10/2024 3:11 pm   INDICATION: Signs/Symptoms:abd pain n/v.   COMPARISON: 01/19/2021   ACCESSION NUMBER(S): [id number]    ORDERING CLINICIAN: EDUARD BAKER   TECHNIQUE: CT of the abdomen and pelvis was performed.  Standard contiguous axial images were obtained at 3 mm slice thickness through the abdomen and pelvis. Coronal and sagittal reconstructions at 3 mm slice thickness were performed.   75 ml of contrast Omnipaque 350 were administered intravenously without immediate complication.   FINDINGS: LOWER CHEST: There are some minimal areas of platelike atelectasis in both lung bases. There is a small hiatus hernia. Otherwise unremarkable.   ABDOMEN:   LIVER: Within normal limits.   BILE DUCTS: Normal caliber.   GALLBLADDER: No calcified stones. No wall thickening.   PANCREAS: Within normal limits.   SPLEEN: Within normal limits.   ADRENAL GLANDS: Bilateral adrenal glands appear normal.   KIDNEYS AND URETERS: There is cortical atrophy of both kidneys. Otherwise the enhanced symmetrically. No hydroureteronephrosis or nephroureterolithiasis is identified.   PELVIS:   BLADDER: Abnormal appearing with mild mural thickening but considerable infiltration of the adjacent fat. Consider cystitis.   REPRODUCTIVE ORGANS: There is calcification of the vas deferens. This is a finding that is often associated with diabetes mellitus. Correlate with known history is.   BOWEL: The stomach is unremarkable.   The small and large bowel are normal in caliber and demonstrate no wall thickening.   The colon is relatively collapsed along its course. The appendix is not seen   VESSELS: There is no aneurysmal dilatation of the abdominal aorta. The IVC appears normal.   PERITONEUM/RETROPERITONEUM/LYMPH NODES: No ascites or free air, no fluid collection.  No abdominopelvic lymphadenopathy is present.   BONES AND ABDOMINAL WALL: No suspicious osseous lesions are identified.  Abdominal wall structures appear intact.       1.  Bilateral symmetric renal cortical atrophy. No hydronephrosis. 2. Possible cystitis. Correlate clinically. 3. Calcification of the vas deferens  which could be associated with diabetes mellitus     MACRO: None   Signed by: Joseph Schoenberger 1/10/2024 3:20 PM Dictation workstation:   [id number]    XR chest 1 view    Result Date: 1/10/2024  Interpreted By:  Nory Diaz, STUDY: XR CHEST 1 VIEW;  1/10/2024 1:27 pm   INDICATION: Signs/Symptoms:retching.   COMPARISON: None.   ACCESSION NUMBER(S): [id number]   ORDERING CLINICIAN: PEE CAUSEY   FINDINGS: No consolidation. No pleural effusion or pneumothorax. Central pulmonary vascular congestion. Normal heart size. No acute osseous abnormality.       Central pulmonary vascular congestion.   Signed by: Nory Diaz 1/10/2024 2:04 PM Dictation workstation:   [id number]        Assessment/Plan   Principal Problem:    Nausea & vomiting  End-stage renal disease on hemodialysis  Diabetes  Hypertension  Hyperlipidemia  Hypokalemia  Lactic acidosis    -Vital signs stable in the ED however blood pressure was elevated in the floor, home medication resumed  -Started on Compazine for nausea, added BMX for heartburn, pantoprazole, patient complaining of no bowel movement for 3 days MiraLAX and Senokot added, Bentyl ordered  -Clear liquid diet, advance as tolerated, received 1 L fluid in the ED will continue with 1 L gentle hydration  -If abdominal pain persists will repeat CT , anticipate pain is from retching and vomiting along with heartburn abdomen benign exam current abdominal pain during the encounter 1 / 10  -Nephrology consulted, patient is due for dialysis today however he will have dialysis tomorrow as he had a session yesterday prior to discharge  -Potassium replaced  Home medication reviewed and resumed  -Full code  -DVT prophylaxis low risk           SIGNATURE: Spenser Gaitan MD PATIENT NAME: Duane Adams   DATE: January 12, 2024 MRN: [id number]   TIME: 7:19 PM

## 2024-01-13 NOTE — PROGRESS NOTES
"Duane Adams is a 53 y.o. male on day 0 of admission presenting with Nausea & vomiting.    Subjective   Seen and examined today, doing slightly better than yesterday, denies any vomiting however he felt nauseous/heartburn, abdominal pain resolved this morning however in the afternoon he was complaining of abdominal pain, had a bowel movement today, was getting dialysis during that count       Objective     Physical Exam  Constitutional awake/alert/oriented x3, mild distress, alert and cooperative   ENMT: mucous membranes moist   Head/Neck: Neck supple   Respiratory/Thorax: Patent airways, CTAB.   Cardiovascular: Regular, rate and rhythm, no murmurs, normal S 1and S 2   Gastrointestinal: Nondistended, soft, non-tender, +BS,.   Musculoskeletal: ROM intact, no joint swelling, normal strength   Extremities: normal extremities, no edema   Neurological: alert and oriented x3, intact senses, motor, response and reflexes, normal strength         Last Recorded Vitals  Blood pressure (!) 182/81, pulse 61, temperature 35.8 °C (96.4 °F), temperature source Temporal, resp. rate 16, height 1.778 m (5' 10\"), weight 99.3 kg (218 lb 14.7 oz), SpO2 100 %.  Intake/Output last 3 Shifts:  I/O last 3 completed shifts:  In: 1615 (16.3 mL/kg) [P.O.:720; I.V.:895 (9 mL/kg)]  Out: - (0 mL/kg)   Weight: 99.3 kg     Relevant Results  Scheduled medications  alum-mag hydroxide-simeth, 10 mL, oral, q6h  amLODIPine, 10 mg, oral, Daily  atorvastatin, 10 mg, oral, Every Mon/Wed/Fri  cholecalciferol, 2,000 Units, oral, Daily  cinacalcet, 30 mg, oral, Every Mon/Wed/Fri  cinacalcet, 30 mg, oral, Once  dicyclomine, 10 mg, oral, 4x daily  famotidine, 20 mg, oral, Every Mon/Wed/Fri  furosemide, 40 mg, oral, Daily  heparin, 2,000 Units, intravenous, After Dialysis  heparin, 4,000 Units, intravenous, After Dialysis  insulin lispro, 0-10 Units, subcutaneous, Before meals & nightly  levothyroxine, 175 mcg, oral, Daily  lidocaine, 5 mL, Mouth/Throat, " q6h  loratadine, 10 mg, oral, Daily  melatonin, 6 mg, oral, Nightly  mirtazapine, 7.5 mg, oral, Nightly  pantoprazole, 40 mg, oral, Daily before breakfast  polyethylene glycol, 17 g, oral, Daily  propranolol LA, 160 mg, oral, Daily  rOPINIRole, 0.5 mg, oral, Nightly  sennosides-docusate sodium, 2 tablet, oral, Daily  sertraline, 150 mg, oral, Daily      Continuous medications     PRN medications  PRN medications: acetaminophen, dextrose 10 % in water (D10W), dextrose, glucagon, hydrOXYzine HCL, LORazepam, prochlorperazine **OR** prochlorperazine **OR** prochlorperazine  Results from last 7 days   Lab Units 01/13/24  1558 01/13/24  0613 01/12/24  0206   WBC AUTO x10*3/uL 8.4 7.7 11.4*   RBC AUTO x10*6/uL 4.56 4.16* 4.85   HEMOGLOBIN g/dL 14.1 12.7* 14.7     Results from last 7 days   Lab Units 01/13/24  1558 01/13/24  0613 01/12/24  0206 01/11/24  0643 01/10/24  1310   SODIUM mmol/L 135* 137 137 138 138   POTASSIUM mmol/L 4.3 4.1 3.4* 4.0 5.2   CHLORIDE mmol/L 97* 98 92* 99 100   CO2 mmol/L 25 26 24 24 23   BUN mg/dL 23 47* 36* 50* 77*   CREATININE mg/dL 5.54* 8.88* 6.61* 7.66* 9.60*   CALCIUM mg/dL 9.1 9.7 10.7* 9.0 9.2   PHOSPHORUS mg/dL 3.5  --   --   --   --    MAGNESIUM mg/dL  --  2.54* 2.22 2.44*  --    BILIRUBIN TOTAL mg/dL  --   --  0.8  --  0.6   ALT U/L  --   --  22  --  20   AST U/L  --   --  28  --  30         Assessment/Plan   Principal Problem:    Nausea & vomiting  End-stage renal disease on hemodialysis  Diabetes  Hypertension  Hyperlipidemia  Hypokalemia  Lactic acidosis     -Vital signs stable this morning, he had 1 episode of elevated blood pressure this afternoon but he had headache and abdominal pain  -Received Bentyl for abdominal pain/cramp did not help, had another episode of vomiting this afternoon however he was doing well this morning, will obtain CT scan of the abdomen  -Started on Compazine for nausea, added Maalox for heartburn, pantoprazole, patient complaining of no bowel movement for  3 days MiraLAX and Senokot added, Bentyl ordered, had bowel movement today  -Diet advanced as tolerated  -Nephrology consulted, had dialysis today  Home medication reviewed and resumed  -Full code  -DVT prophylaxis low risk        I spent 35 minutes in the professional and overall care of this patient.      Spenser Gaitan MD

## 2024-01-13 NOTE — CARE PLAN
The patient's goals for the shift include  free from nausea and emesis    The clinical goals for the shift include rest    Over the shift, the patient had no complaints of nausea. Tolerated clear liquids at dinner

## 2024-01-14 PROBLEM — R11.2 INTRACTABLE NAUSEA AND VOMITING: Status: ACTIVE | Noted: 2024-01-14

## 2024-01-14 LAB
ALBUMIN SERPL BCP-MCNC: 3.9 G/DL (ref 3.4–5)
ANION GAP SERPL CALC-SCNC: 19 MMOL/L (ref 10–20)
BACTERIA UR CULT: NO GROWTH
BUN SERPL-MCNC: 31 MG/DL (ref 6–23)
CALCIUM SERPL-MCNC: 9.2 MG/DL (ref 8.6–10.3)
CHLORIDE SERPL-SCNC: 94 MMOL/L (ref 98–107)
CO2 SERPL-SCNC: 23 MMOL/L (ref 21–32)
CREAT SERPL-MCNC: 6.87 MG/DL (ref 0.5–1.3)
EGFRCR SERPLBLD CKD-EPI 2021: 9 ML/MIN/1.73M*2
ERYTHROCYTE [DISTWIDTH] IN BLOOD BY AUTOMATED COUNT: 12.7 % (ref 11.5–14.5)
GLUCOSE BLD MANUAL STRIP-MCNC: 182 MG/DL (ref 74–99)
GLUCOSE BLD MANUAL STRIP-MCNC: 188 MG/DL (ref 74–99)
GLUCOSE BLD MANUAL STRIP-MCNC: 201 MG/DL (ref 74–99)
GLUCOSE BLD MANUAL STRIP-MCNC: 240 MG/DL (ref 74–99)
GLUCOSE SERPL-MCNC: 219 MG/DL (ref 74–99)
HCT VFR BLD AUTO: 40.9 % (ref 41–52)
HGB BLD-MCNC: 13 G/DL (ref 13.5–17.5)
MCH RBC QN AUTO: 30.2 PG (ref 26–34)
MCHC RBC AUTO-ENTMCNC: 31.8 G/DL (ref 32–36)
MCV RBC AUTO: 95 FL (ref 80–100)
NRBC BLD-RTO: 0 /100 WBCS (ref 0–0)
PHOSPHATE SERPL-MCNC: 4.5 MG/DL (ref 2.5–4.9)
PLATELET # BLD AUTO: 238 X10*3/UL (ref 150–450)
POTASSIUM SERPL-SCNC: 4.8 MMOL/L (ref 3.5–5.3)
RBC # BLD AUTO: 4.3 X10*6/UL (ref 4.5–5.9)
SODIUM SERPL-SCNC: 131 MMOL/L (ref 136–145)
WBC # BLD AUTO: 9.4 X10*3/UL (ref 4.4–11.3)

## 2024-01-14 PROCEDURE — 82947 ASSAY GLUCOSE BLOOD QUANT: CPT

## 2024-01-14 PROCEDURE — 2500000001 HC RX 250 WO HCPCS SELF ADMINISTERED DRUGS (ALT 637 FOR MEDICARE OP): Performed by: INTERNAL MEDICINE

## 2024-01-14 PROCEDURE — 1100000001 HC PRIVATE ROOM DAILY

## 2024-01-14 PROCEDURE — 0DB68ZX EXCISION OF STOMACH, VIA NATURAL OR ARTIFICIAL OPENING ENDOSCOPIC, DIAGNOSTIC: ICD-10-PCS | Performed by: PHYSICIAN ASSISTANT

## 2024-01-14 PROCEDURE — 80069 RENAL FUNCTION PANEL: CPT | Performed by: INTERNAL MEDICINE

## 2024-01-14 PROCEDURE — 2500000004 HC RX 250 GENERAL PHARMACY W/ HCPCS (ALT 636 FOR OP/ED): Performed by: INTERNAL MEDICINE

## 2024-01-14 PROCEDURE — 99232 SBSQ HOSP IP/OBS MODERATE 35: CPT | Performed by: INTERNAL MEDICINE

## 2024-01-14 PROCEDURE — 94760 N-INVAS EAR/PLS OXIMETRY 1: CPT

## 2024-01-14 PROCEDURE — 99223 1ST HOSP IP/OBS HIGH 75: CPT | Performed by: PHYSICIAN ASSISTANT

## 2024-01-14 PROCEDURE — 85027 COMPLETE CBC AUTOMATED: CPT | Performed by: INTERNAL MEDICINE

## 2024-01-14 PROCEDURE — 36415 COLL VENOUS BLD VENIPUNCTURE: CPT | Performed by: INTERNAL MEDICINE

## 2024-01-14 PROCEDURE — 2500000004 HC RX 250 GENERAL PHARMACY W/ HCPCS (ALT 636 FOR OP/ED): Performed by: PHYSICIAN ASSISTANT

## 2024-01-14 PROCEDURE — 2500000002 HC RX 250 W HCPCS SELF ADMINISTERED DRUGS (ALT 637 FOR MEDICARE OP, ALT 636 FOR OP/ED): Performed by: INTERNAL MEDICINE

## 2024-01-14 RX ORDER — PANTOPRAZOLE SODIUM 40 MG/1
40 TABLET, DELAYED RELEASE ORAL
Status: DISCONTINUED | OUTPATIENT
Start: 2024-01-14 | End: 2024-01-17 | Stop reason: HOSPADM

## 2024-01-14 RX ORDER — CALCIUM CARBONATE 200(500)MG
1000 TABLET,CHEWABLE ORAL ONCE
Status: COMPLETED | OUTPATIENT
Start: 2024-01-14 | End: 2024-01-14

## 2024-01-14 RX ORDER — METOCLOPRAMIDE 10 MG/1
5 TABLET ORAL
Status: DISCONTINUED | OUTPATIENT
Start: 2024-01-14 | End: 2024-01-17 | Stop reason: HOSPADM

## 2024-01-14 RX ORDER — SUCRALFATE 1 G/10ML
1 SUSPENSION ORAL EVERY 6 HOURS SCHEDULED
Status: DISCONTINUED | OUTPATIENT
Start: 2024-01-14 | End: 2024-01-17 | Stop reason: HOSPADM

## 2024-01-14 RX ADMIN — POLYETHYLENE GLYCOL 3350 17 G: 17 POWDER, FOR SOLUTION ORAL at 09:00

## 2024-01-14 RX ADMIN — LORATADINE 10 MG: 10 TABLET ORAL at 20:39

## 2024-01-14 RX ADMIN — MIRTAZAPINE 7.5 MG: 15 TABLET, FILM COATED ORAL at 20:39

## 2024-01-14 RX ADMIN — PROPRANOLOL HYDROCHLORIDE 160 MG: 80 CAPSULE, EXTENDED RELEASE ORAL at 09:13

## 2024-01-14 RX ADMIN — FUROSEMIDE 40 MG: 40 TABLET ORAL at 09:00

## 2024-01-14 RX ADMIN — SERTRALINE HYDROCHLORIDE 150 MG: 100 TABLET ORAL at 20:39

## 2024-01-14 RX ADMIN — LEVOTHYROXINE SODIUM 175 MCG: 175 TABLET ORAL at 09:12

## 2024-01-14 RX ADMIN — PROCHLORPERAZINE EDISYLATE 10 MG: 5 INJECTION INTRAMUSCULAR; INTRAVENOUS at 04:34

## 2024-01-14 RX ADMIN — SUCRALFATE 1 G: 1 SUSPENSION ORAL at 12:40

## 2024-01-14 RX ADMIN — ROPINIROLE HYDROCHLORIDE 0.5 MG: 1 TABLET, FILM COATED ORAL at 20:39

## 2024-01-14 RX ADMIN — DICYCLOMINE HYDROCHLORIDE 10 MG: 10 CAPSULE ORAL at 17:43

## 2024-01-14 RX ADMIN — LIDOCAINE HYDROCHLORIDE 5 ML: 20 SOLUTION ORAL; TOPICAL at 11:30

## 2024-01-14 RX ADMIN — AMLODIPINE BESYLATE 10 MG: 10 TABLET ORAL at 09:12

## 2024-01-14 RX ADMIN — PANTOPRAZOLE SODIUM 40 MG: 40 TABLET, DELAYED RELEASE ORAL at 16:35

## 2024-01-14 RX ADMIN — METOCLOPRAMIDE 5 MG: 10 TABLET ORAL at 20:39

## 2024-01-14 RX ADMIN — PANTOPRAZOLE SODIUM 40 MG: 40 TABLET, DELAYED RELEASE ORAL at 06:47

## 2024-01-14 RX ADMIN — METOCLOPRAMIDE 5 MG: 10 TABLET ORAL at 16:35

## 2024-01-14 RX ADMIN — ALUMINUM HYDROXIDE, MAGNESIUM HYDROXIDE, AND DIMETHICONE 10 ML: 200; 20; 200 SUSPENSION ORAL at 06:47

## 2024-01-14 RX ADMIN — DICYCLOMINE HYDROCHLORIDE 10 MG: 10 CAPSULE ORAL at 06:47

## 2024-01-14 RX ADMIN — INSULIN LISPRO 2 UNITS: 100 INJECTION, SOLUTION INTRAVENOUS; SUBCUTANEOUS at 20:46

## 2024-01-14 RX ADMIN — CALCIUM CARBONATE (ANTACID) CHEW TAB 500 MG 1000 MG: 500 CHEW TAB at 02:52

## 2024-01-14 RX ADMIN — PROCHLORPERAZINE EDISYLATE 10 MG: 5 INJECTION INTRAMUSCULAR; INTRAVENOUS at 10:32

## 2024-01-14 RX ADMIN — Medication 6 MG: at 20:39

## 2024-01-14 RX ADMIN — DICYCLOMINE HYDROCHLORIDE 10 MG: 10 CAPSULE ORAL at 12:40

## 2024-01-14 RX ADMIN — ALUMINUM HYDROXIDE, MAGNESIUM HYDROXIDE, AND DIMETHICONE 10 ML: 200; 20; 200 SUSPENSION ORAL at 10:32

## 2024-01-14 RX ADMIN — SENNOSIDES AND DOCUSATE SODIUM 2 TABLET: 8.6; 5 TABLET ORAL at 20:39

## 2024-01-14 RX ADMIN — CALCIUM CARBONATE (ANTACID) CHEW TAB 500 MG 500 MG: 500 CHEW TAB at 20:39

## 2024-01-14 RX ADMIN — DICYCLOMINE HYDROCHLORIDE 10 MG: 10 CAPSULE ORAL at 20:39

## 2024-01-14 RX ADMIN — INSULIN LISPRO 4 UNITS: 100 INJECTION, SOLUTION INTRAVENOUS; SUBCUTANEOUS at 09:11

## 2024-01-14 ASSESSMENT — ENCOUNTER SYMPTOMS
DIZZINESS: 0
FEVER: 0
SHORTNESS OF BREATH: 1
MYALGIAS: 0
COUGH: 1
HEADACHES: 0
SORE THROAT: 1
DYSURIA: 0
WHEEZING: 0
ROS SKIN COMMENTS: ITCHY
UNEXPECTED WEIGHT CHANGE: 1
HEMATURIA: 0
NUMBNESS: 1
APPETITE CHANGE: 1
CONFUSION: 0
JOINT SWELLING: 0
WEAKNESS: 0
EYE PAIN: 0
TROUBLE SWALLOWING: 0
ARTHRALGIAS: 0
CHILLS: 0

## 2024-01-14 ASSESSMENT — PAIN SCALES - GENERAL
PAINLEVEL_OUTOF10: 0 - NO PAIN
PAINLEVEL_OUTOF10: 0 - NO PAIN

## 2024-01-14 ASSESSMENT — COGNITIVE AND FUNCTIONAL STATUS - GENERAL
MOBILITY SCORE: 24
MOBILITY SCORE: 24
DAILY ACTIVITIY SCORE: 24
DAILY ACTIVITIY SCORE: 24

## 2024-01-14 NOTE — PROGRESS NOTES
Spiritual Care Visit    Clinical Encounter Type  Visited With: Patient  Routine Visit: Introduction  Continue Visiting: No                                            Taxonomy  Intended Effects: Establish rapport and connectedness, Yolanda affirmation, Build relationship of care and support, Demonstrate caring and concern    For the moment, he declined my offer to visit.   Haydee rosales

## 2024-01-14 NOTE — CARE PLAN
The patient's goals for the shift include  free from nausea    The clinical goals for the shift include remain free from nausea this shift      Problem: Diabetes  Goal: Maintain electrolyte levels within acceptable range throughout shift  Outcome: Progressing  Goal: Maintain glucose levels >70mg/dl to <250mg/dl throughout shift  Outcome: Progressing     Problem: Pain - Adult  Goal: Verbalizes/displays adequate comfort level or baseline comfort level  Outcome: Progressing     Problem: Safety - Adult  Goal: Free from fall injury  Outcome: Progressing     Problem: Discharge Planning  Goal: Discharge to home or other facility with appropriate resources  Outcome: Progressing     Problem: Chronic Conditions and Co-morbidities  Goal: Patient's chronic conditions and co-morbidity symptoms are monitored and maintained or improved  Outcome: Progressing     Problem: ESRD: Dialysis, CAPD  Goal: Electrolytes restored to baseline  Outcome: Progressing  Goal: Fatigue of chronic illness managed  Outcome: Progressing  Goal: Follows dialysis treatment plan  Outcome: Progressing

## 2024-01-14 NOTE — PROGRESS NOTES
"Duane Adams is a 53 y.o. male on day 0 of admission presenting with Nausea & vomiting.    Subjective   Seen and examined today, still having nausea and vomiting, reported 4/10 abd pain.       Objective     Physical Exam  Constitutional awake/alert/oriented x3, mild distress, alert and cooperative   ENMT: mucous membranes moist   Head/Neck: Neck supple   Respiratory/Thorax: Patent airways, CTAB.   Cardiovascular: Regular, rate and rhythm, no murmurs, normal S 1and S 2   Gastrointestinal: Nondistended, soft, non-tender, +BS,.   Musculoskeletal: ROM intact, no joint swelling, normal strength   Extremities: normal extremities, no edema   Neurological: alert and oriented x3, intact senses, motor, response and reflexes, normal strength         Last Recorded Vitals  Blood pressure 165/77, pulse 65, temperature 36.7 °C (98.1 °F), temperature source Temporal, resp. rate 17, height 1.778 m (5' 10\"), weight 99.3 kg (218 lb 14.7 oz), SpO2 97 %.  Intake/Output last 3 Shifts:  I/O last 3 completed shifts:  In: 3350 (33.7 mL/kg) [P.O.:1060; I.V.:1890 (19 mL/kg); Other:400]  Out: 2000 (20.1 mL/kg) [Other:2000]  Weight: 99.3 kg     Relevant Results  Scheduled medications  alum-mag hydroxide-simeth, 10 mL, oral, q6h  amLODIPine, 10 mg, oral, Daily  atorvastatin, 10 mg, oral, Every Mon/Wed/Fri  calcium carbonate, 500 mg, oral, Daily  cholecalciferol, 2,000 Units, oral, Daily  cinacalcet, 30 mg, oral, Every Mon/Wed/Fri  cinacalcet, 30 mg, oral, Once  dicyclomine, 10 mg, oral, 4x daily  famotidine, 20 mg, oral, Every Mon/Wed/Fri  furosemide, 40 mg, oral, Daily  heparin, 2,000 Units, intravenous, After Dialysis  heparin, 4,000 Units, intravenous, After Dialysis  insulin lispro, 0-10 Units, subcutaneous, Before meals & nightly  levothyroxine, 175 mcg, oral, Daily  lidocaine, 5 mL, Mouth/Throat, q6h  loratadine, 10 mg, oral, Daily  melatonin, 6 mg, oral, Nightly  metoclopramide, 5 mg, oral, Before meals & nightly  mirtazapine, 7.5 " mg, oral, Nightly  pantoprazole, 40 mg, oral, BID AC  polyethylene glycol, 17 g, oral, Daily  propranolol LA, 160 mg, oral, Daily  rOPINIRole, 0.5 mg, oral, Nightly  sennosides-docusate sodium, 2 tablet, oral, Daily  sertraline, 150 mg, oral, Daily  sucralfate, 1 g, oral, q6h REBEL      Continuous medications     PRN medications  PRN medications: acetaminophen, dextrose 10 % in water (D10W), dextrose, glucagon, hydrOXYzine HCL, LORazepam, prochlorperazine **OR** prochlorperazine **OR** prochlorperazine  Results from last 7 days   Lab Units 01/14/24  0615 01/13/24  1558 01/13/24  0613   WBC AUTO x10*3/uL 9.4 8.4 7.7   RBC AUTO x10*6/uL 4.30* 4.56 4.16*   HEMOGLOBIN g/dL 13.0* 14.1 12.7*       Results from last 7 days   Lab Units 01/14/24  0615 01/13/24  1558 01/13/24  0613 01/12/24  0206 01/11/24  0643 01/10/24  1310   SODIUM mmol/L 131* 135* 137 137 138 138   POTASSIUM mmol/L 4.8 4.3 4.1 3.4* 4.0 5.2   CHLORIDE mmol/L 94* 97* 98 92* 99 100   CO2 mmol/L 23 25 26 24 24 23   BUN mg/dL 31* 23 47* 36* 50* 77*   CREATININE mg/dL 6.87* 5.54* 8.88* 6.61* 7.66* 9.60*   CALCIUM mg/dL 9.2 9.1 9.7 10.7* 9.0 9.2   PHOSPHORUS mg/dL 4.5 3.5  --   --   --   --    MAGNESIUM mg/dL  --   --  2.54* 2.22 2.44*  --    BILIRUBIN TOTAL mg/dL  --   --   --  0.8  --  0.6   ALT U/L  --   --   --  22  --  20   AST U/L  --   --   --  28  --  30           Assessment/Plan   Principal Problem:    Nausea & vomiting  Active Problems:    Intractable nausea and vomiting  End-stage renal disease on hemodialysis  Diabetes  Hypertension  Hyperlipidemia  Hypokalemia  Lactic acidosis     -Vital signs stable this morning  -Was still complaining of nausea vomiting and abdominal pain, CT scan was done yesterday which showed wall thickening and mild fat stranding involving the urinary bladder, correlate with cystitis, however urine culture came back negative, no need for antibiotic, 4 mm stable right lower lobe pulmonary nodule, added Carafate this morning to  Maalox and PPI, GI consulted for persistent symptoms appreciate recommendations  -Continue Compazine for nausea, continue bowel regimen  -Diet advanced as tolerated  -Nephrology consulted, had dialysis yesterday   -Home medication reviewed and resumed  -Full code  -DVT prophylaxis low risk        I spent 35 minutes in the professional and overall care of this patient.      Spenser Gaitan MD

## 2024-01-14 NOTE — CARE PLAN
Problem: Pain - Adult  Goal: Verbalizes/displays adequate comfort level or baseline comfort level  Outcome: Progressing     Problem: Safety - Adult  Goal: Free from fall injury  Outcome: Progressing

## 2024-01-14 NOTE — CONSULTS
Department of Internal Medicine  Gastroenterology  Consult note      Reason for Consult: Persistent nausea and vomiting, GERD, recurrent admission for the same reason    Chief Complaint: nausea, vomiting    History Obtained from: chart review and patient reports    History of Present Illness      The patient is a 53 y.o. male with signficant past medical history of end-stage renal disease on dialysis, hypertension, thyroid cancer status post resection and diabetes who presents for nausea and vomiting.  Of note he was recently admitted 1/10-1/11 for nausea and vomiting.    He states he had an issue with nausea and vomiting for many years.  He is to get better with Dilaudid and Zofran.  Now is not working as well.  He states he has nausea every morning with abdominal pain in the epigastric region that he describes as crampy in nature.  He denies any aggravating or alleviating symptoms.  He states he throws up about 1-2 times a week.  He denies any coffee-ground emesis or hematemesis.  He endorses some dysphagia but only when he has a dry mouth to solids.  He denies any odynophagia.  He endorses years of acid reflux or regurgitation, burning, cough, and vomiting.  He takes famotidine daily for this.    He denies any constipation, diarrhea, or melena.  He states he has hemorrhoids and will occasionally sees some bright red blood on the toilet paper or streaks in the stool.  He typically moves his bowels 1-2 times a day of normal caliber and color.    He states he had a colonoscopy about 5 years ago and believes some polyps removed.  He says about same time he also had an EGD which was unremarkable.  [facility]    Abdominal surgeries include an appendectomy as a child as well as having pyloric stenosis and a volvulus    Allergies  Aspirin, Acetaminophen, Nsaids (non-steroidal anti-inflammatory drug), Omeprazole, Penicillins, Simvastatin, and Topiramate    Current Medication    Current  Facility-Administered Medications:     acetaminophen (Tylenol) tablet 650 mg, 650 mg, oral, q6h PRN, Spenser Gaitan MD    alum-mag hydroxide-simeth (Mylanta) 200-200-20 mg/5 mL oral suspension 10 mL, 10 mL, oral, q6h, Spenser Gaitan MD, 10 mL at 01/14/24 1032    amLODIPine (Norvasc) tablet 10 mg, 10 mg, oral, Daily, Spenser Gaitan MD, 10 mg at 01/14/24 0912    atorvastatin (Lipitor) tablet 10 mg, 10 mg, oral, Every Mon/Wed/Fri, Spenser Gaitan MD, 10 mg at 01/12/24 2108    calcium carbonate (Tums) chewable tablet 500 mg, 500 mg, oral, Daily, Abraham Hardin MD, 500 mg at 01/13/24 2053    cholecalciferol (Vitamin D-3) tablet 2,000 Units, 2,000 Units, oral, Daily, Spenser Gaitan MD, 2,000 Units at 01/13/24 2112    cinacalcet (Sensipar) tablet 30 mg, 30 mg, oral, Every Mon/Wed/Fri, Spenser Gaitan MD, 30 mg at 01/13/24 1628    cinacalcet (Sensipar) tablet 30 mg, 30 mg, oral, Once, Spenser Gaitan MD    dextrose 10 % in water (D10W) infusion, 0.3 g/kg/hr, intravenous, Once PRN, Spenser Gaitan MD    dextrose 50 % injection 25 g, 25 g, intravenous, q15 min PRN, Spenser Gaitan MD    dicyclomine (Bentyl) capsule 10 mg, 10 mg, oral, 4x daily, Spenser Gaitan MD, 10 mg at 01/14/24 0647    famotidine (Pepcid) tablet 20 mg, 20 mg, oral, Every Mon/Wed/Fri, Spenser Gaitan MD, 20 mg at 01/12/24 2108    furosemide (Lasix) tablet 40 mg, 40 mg, oral, Daily, Spenser Gaitan MD, 40 mg at 01/14/24 0900    glucagon (Glucagen) injection 1 mg, 1 mg, intramuscular, q15 min PRN, Spenser Gaitan MD    heparin 1,000 unit/mL injection 2,000 Units, 2,000 Units, intravenous, After Dialysis, Viktor Alvarez MD    heparin 1,000 unit/mL injection 4,000 Units, 4,000 Units, intravenous, After Dialysis, Viktor Alvarez MD, 4,000 Units at 01/13/24 1000    hydrOXYzine HCL (Atarax) tablet 50 mg, 50 mg, oral, Nightly PRN, Spenser Gaitan MD    insulin lispro (HumaLOG) injection 0-10  Units, 0-10 Units, subcutaneous, Before meals & nightly, Spenser Gaitan MD, 4 Units at 01/14/24 0911    levothyroxine (Synthroid, Levoxyl) tablet 175 mcg, 175 mcg, oral, Daily, Spenser Gaitan MD, 175 mcg at 01/14/24 0912    lidocaine (Xylocaine) 2 % mouth solution 5 mL, 5 mL, Mouth/Throat, q6h, Spenser Gaitan MD, 5 mL at 01/14/24 1130    loratadine (Claritin) tablet 10 mg, 10 mg, oral, Daily, Spenser Gaitan MD, 10 mg at 01/13/24 2054    LORazepam (Ativan) tablet 0.5 mg, 0.5 mg, oral, Nightly PRN, Spenser Gaitan MD    melatonin tablet 6 mg, 6 mg, oral, Nightly, Spenser Gaitan MD, 6 mg at 01/13/24 2054    mirtazapine (Remeron) tablet 7.5 mg, 7.5 mg, oral, Nightly, Spenser Gaitan MD, 7.5 mg at 01/13/24 2054    pantoprazole (ProtoNix) EC tablet 40 mg, 40 mg, oral, Daily before breakfast, Spenser Gaitan MD, 40 mg at 01/14/24 0647    polyethylene glycol (Glycolax, Miralax) packet 17 g, 17 g, oral, Daily, Spenser Gaitan MD, 17 g at 01/14/24 0900    prochlorperazine (Compazine) tablet 10 mg, 10 mg, oral, q6h PRN **OR** prochlorperazine (Compazine) injection 10 mg, 10 mg, intravenous, q6h PRN, 10 mg at 01/14/24 1032 **OR** prochlorperazine (Compazine) suppository 25 mg, 25 mg, rectal, q12h PRN, Spenser Gaitan MD    propranolol LA (Inderal LA) 24 hr capsule 160 mg, 160 mg, oral, Daily, Spenser Gaitan MD, 160 mg at 01/14/24 0913    rOPINIRole (Requip) tablet 0.5 mg, 0.5 mg, oral, Nightly, Spenser Gaitan MD, 0.5 mg at 01/13/24 2054    sennosides-docusate sodium (Dinah-Colace) 8.6-50 mg per tablet 2 tablet, 2 tablet, oral, Daily, Spenser Gaitan MD, 2 tablet at 01/13/24 2056    sertraline (Zoloft) tablet 150 mg, 150 mg, oral, Daily, Spenser Gaitan MD, 150 mg at 01/13/24 2054    sucralfate (Carafate) suspension 1 g, 1 g, oral, q6h REBEL, Spenser Giatan MD    Past Medical History  Active Ambulatory Problems     Diagnosis Date Noted    ESRD on dialysis (CMS/[facility])  01/10/2024    Nausea and vomiting 01/10/2024    Controlled type 2 diabetes mellitus with complication, with long-term current use of insulin (CMS/[facility]) 01/10/2024    Primary hypertension 01/10/2024    Other hyperlipidemia 01/10/2024     Resolved Ambulatory Problems     Diagnosis Date Noted    No Resolved Ambulatory Problems     Past Medical History:   Diagnosis Date    Encounter for other preprocedural examination 11/03/2016    Personal history of malignant neoplasm of thyroid     Personal history of other diseases of the circulatory system     Personal history of other endocrine, nutritional and metabolic disease        Past Surgical History  Past Surgical History:   Procedure Laterality Date    OTHER SURGICAL HISTORY  12/06/2018    Thyroidectomy    OTHER SURGICAL HISTORY  12/06/2018    Nasal polypectomy    OTHER SURGICAL HISTORY  12/06/2018    Appendectomy       Family History  No family history on file.  No family history of stomach or colon cancer    Social History  TOBACCO:  reports that he has never smoked. He has never used smokeless tobacco.  ETOH:  reports current alcohol use.  DRUGS:  reports that he does not currently use drugs.  MARITAL STATUS:   OCCUPATION:    Review of Systems  Review of Systems   Constitutional:  Positive for appetite change and unexpected weight change. Negative for chills and fever.   HENT:  Positive for sore throat. Negative for mouth sores and trouble swallowing.    Eyes:  Negative for pain and visual disturbance.   Respiratory:  Positive for cough and shortness of breath. Negative for wheezing.    Cardiovascular:  Positive for chest pain (intermittent).   Genitourinary:  Negative for decreased urine volume, dysuria and hematuria.   Musculoskeletal:  Negative for arthralgias, joint swelling and myalgias.   Skin:  Negative for pallor and rash.        itchy   Neurological:  Positive for numbness (occasionally in hands/feet). Negative for dizziness, weakness and headaches.  "  Psychiatric/Behavioral:  Negative for confusion.        PHYSICAL EXAM  VS: /77 (BP Location: Left arm, Patient Position: Lying)   Pulse 65   Temp 36.7 °C (98.1 °F) (Temporal)   Resp 17   Ht 1.778 m (5' 10\")   Wt 99.3 kg (218 lb 14.7 oz)   SpO2 97%   BMI 31.41 kg/m²  Body mass index is 31.41 kg/m².  Physical Exam  Constitutional:       General: He is not in acute distress.     Appearance: Normal appearance.   HENT:      Mouth/Throat:      Mouth: Mucous membranes are moist.      Pharynx: Oropharynx is clear.   Eyes:      General: No scleral icterus.     Extraocular Movements: Extraocular movements intact.      Conjunctiva/sclera: Conjunctivae normal.      Pupils: Pupils are equal, round, and reactive to light.   Cardiovascular:      Rate and Rhythm: Normal rate and regular rhythm.      Heart sounds: No murmur heard.  Pulmonary:      Effort: Pulmonary effort is normal.      Breath sounds: No wheezing or rhonchi.   Abdominal:      General: Bowel sounds are normal. There is no distension.      Palpations: Abdomen is soft. There is no mass.      Tenderness: There is no abdominal tenderness (mild diffuse abdominal tendnerness to palpation).      Hernia: No hernia is present.   Musculoskeletal:         General: No swelling or deformity.   Skin:     General: Skin is warm.      Coloration: Skin is not jaundiced.   Neurological:      General: No focal deficit present.      Mental Status: He is alert and oriented to person, place, and time.   Psychiatric:         Mood and Affect: Mood normal.          DATA  Recent blood work and relevant radiology studies were reviewed and discussed with the patient   Results from last 7 days   Lab Units 01/14/24  0615   WBC AUTO x10*3/uL 9.4   RBC AUTO x10*6/uL 4.30*   HEMOGLOBIN g/dL 13.0*   HEMATOCRIT % 40.9*   MCV fL 95   MCHC g/dL 31.8*   RDW % 12.7   PLATELETS AUTO x10*3/uL 238       Results from last 72 hours   Lab Units 01/14/24  0615 01/13/24  0613 01/12/24  0206 "   SODIUM mmol/L 131*   < > 137   POTASSIUM mmol/L 4.8   < > 3.4*   CHLORIDE mmol/L 94*   < > 92*   CO2 mmol/L 23   < > 24   BUN mg/dL 31*   < > 36*   CREATININE mg/dL 6.87*   < > 6.61*   CALCIUM mg/dL 9.2   < > 10.7*   PROTEIN TOTAL g/dL  --   --  8.5*   BILIRUBIN TOTAL mg/dL  --   --  0.8   ALK PHOS U/L  --   --  75   AST U/L  --   --  28   ALT U/L  --   --  22    < > = values in this interval not displayed.       Results from last 72 hours   Lab Units 01/12/24  0206   LIPASE U/L 53      Latest Reference Range & Units 09/12/23 09:59   Hemoglobin A1C % 9.1 !   !: Data is abnormal    RADIOLOGY REVIEW  CT abdomen Pelvis WO Contrast 1/13/2024  IMPRESSION:  Wall thickening and mild fat stranding involving the urinary bladder.  Please correlate clinically to exclude cystitis. Intraluminal density  throughout the urinary bladder is probably diluted contrast from  recent CT. Hemorrhage may appear similar.      4 mm stable right lower lobe pulmonary nodule. If the patient has no  risk factors for cancer, such as smoking, no follow-up is necessary.  If the patient has risk factors, a follow-up chest CT in 12 months  may be considered.    ENDOSCOPIC REVIEW  NA    IMPRESSION/RECOMMENDATIONS    Acute on chronic nausea and vomiting -could be component of gastroparesis given elevated blood sugar and HA1C 9.3 in September.  History of pyloric stenosis.  Acid reflux -currently not controlled with famotidine  End-stage renal disease on dialysis    Plan  Will obtain EGD 1/15/2024 with Dr. Back, patient is agreeable  Recommend pantoprazole 40 mg twice daily for 8 weeks and titrated down to once daily with famotidine at night.  Recommend tight glycemic control.  Last hemoglobin A1c was 9 prior records.  Continue supportive care with antiemetics  Currently on carb controlled diet with n.p.o. at midnight  Continue supportive care per primary team      (Electronically signed byDenise Kuhn PA-C on 1/14/2024 at 11:39 AM)    Inpatient  consult to Gastroenterology  Consult performed by: Denise Kuhn PA-C  Consult ordered by: Spenser Gaitan MD

## 2024-01-14 NOTE — PROGRESS NOTES
Renal Progress Note  Assessment:  53 y.o. male with history s/f ESRD on IHD, HTN, T2DM, recurrent N/V, remote h/o thyroid cancer who was recently discharged after being managed for N/V and presents hours later for the same.     ESRD on IHD MWF via [facility], pt of Dr. Aleman, EDW 96 kg   Hypertensive urgency: improved  Recurrent N/V  Hypokalemia   Hypercalcemia   Lactic acidosis: improved   Anemia of renal disease  Secondary renal hyperparathyroidism   Over the weekend nephrology coverage  -Patient is clinically euvolemic  -Fluctuating with systolic hypertension  -No major electrolyte imbalance corrected sodium to glucose of 219 is 135  -No anemia requiring NORA  -No hyperphosphatemia requiring further management calcium 9.2  Plan:  -Hemodialysis tomorrow will try to control blood pressure through ultrafiltration if failed will adjust medication  -Add Reglan 5 mg 30 minutes before meal and bed and discussed with the patient the possibility of tardive dyskinesia and extrapyramidal symptoms if it occurs we will stop the medicine patient does understand    Subjective:   Admit Date: 1/12/2024    Interval History: Patient seen and examined uneventful night still nausea specially in the morning the emesis and the epigastric pain is intermittent patient did have an endoscopy 3 years ago is a kidney transplant candidate he did once try Reglan uncertain if tardive dyskinesia was the reason why he stopped it or not patient otherwise asymptomatic from the uremic and fluid volume standpoint      Medications:   Scheduled Meds:alum-mag hydroxide-simeth, 10 mL, oral, q6h  amLODIPine, 10 mg, oral, Daily  atorvastatin, 10 mg, oral, Every Mon/Wed/Fri  calcium carbonate, 500 mg, oral, Daily  cholecalciferol, 2,000 Units, oral, Daily  cinacalcet, 30 mg, oral, Every Mon/Wed/Fri  cinacalcet, 30 mg, oral, Once  dicyclomine, 10 mg, oral, 4x daily  famotidine, 20 mg, oral, Every Mon/Wed/Fri  furosemide, 40 mg, oral, Daily  heparin, 2,000  "Units, intravenous, After Dialysis  heparin, 4,000 Units, intravenous, After Dialysis  insulin lispro, 0-10 Units, subcutaneous, Before meals & nightly  levothyroxine, 175 mcg, oral, Daily  lidocaine, 5 mL, Mouth/Throat, q6h  loratadine, 10 mg, oral, Daily  melatonin, 6 mg, oral, Nightly  mirtazapine, 7.5 mg, oral, Nightly  pantoprazole, 40 mg, oral, BID AC  polyethylene glycol, 17 g, oral, Daily  propranolol LA, 160 mg, oral, Daily  rOPINIRole, 0.5 mg, oral, Nightly  sennosides-docusate sodium, 2 tablet, oral, Daily  sertraline, 150 mg, oral, Daily  sucralfate, 1 g, oral, q6h REBEL      Continuous Infusions:     CBC:   Lab Results   Component Value Date    HGB 13.0 (L) 01/14/2024    HGB 14.1 01/13/2024    WBC 9.4 01/14/2024    WBC 8.4 01/13/2024     01/14/2024     01/13/2024      Anemia:  No results found for: \"FERRITIN\", \"IRON\", \"TIBC\"   BMP:    Lab Results   Component Value Date     (L) 01/14/2024     (L) 01/13/2024    K 4.8 01/14/2024    K 4.3 01/13/2024    CL 94 (L) 01/14/2024    CL 97 (L) 01/13/2024    CO2 23 01/14/2024    CO2 25 01/13/2024    BUN 31 (H) 01/14/2024    BUN 23 01/13/2024    CREATININE 6.87 (H) 01/14/2024    CREATININE 5.54 (H) 01/13/2024      Bone disease:   Lab Results   Component Value Date    PHOS 4.5 01/14/2024      Urinalysis:    Lab Results   Component Value Date    STONE 5.0 01/12/2024    PROTUR >=500 (3+) (N) 01/12/2024    GLUCOSEU 150 (2+) (A) 01/12/2024    BLOODU SMALL (1+) (A) 01/12/2024    KETONESU NEGATIVE 01/12/2024    BILIRUBINU NEGATIVE 01/12/2024    NITRITEU NEGATIVE 01/12/2024    LEUKOCYTESU NEGATIVE 01/12/2024        Objective:   Vitals: /77 (BP Location: Left arm, Patient Position: Lying)   Pulse 65   Temp 36.7 °C (98.1 °F) (Temporal)   Resp 17   Ht 1.778 m (5' 10\")   Wt 99.3 kg (218 lb 14.7 oz)   SpO2 97%   BMI 31.41 kg/m²    Wt Readings from Last 3 Encounters:   01/13/24 99.3 kg (218 lb 14.7 oz)   01/10/24 96 kg (211 lb 10.3 oz)   06/14/21 " 91.8 kg (202 lb 5 oz)      24HR INTAKE/OUTPUT:    Intake/Output Summary (Last 24 hours) at 1/14/2024 1251  Last data filed at 1/13/2024 2000  Gross per 24 hour   Intake 840 ml   Output 2000 ml   Net -1160 ml     Admission weight:  Weight: 94.3 kg (208 lb 0.1 oz)      Constitutional:  Alert, awake, no apparent distress   Skin:normal, no rash  HEENT:sclera anicteric.  Head atraumatic normocephalic  Neck:supple with no thyromegally  Cardiovascular:  S1, S2 without m/r/g   Respiratory:  CTA B without w/r/r   Abdomen: +bs, soft, nt  Ext: no LE edema  Musculoskeletal:Intact  Neuro:Alert and oriented with no deficit      Electronically signed by Nelson Shaw MD on 1/14/2024 at 12:51 PM

## 2024-01-15 ENCOUNTER — ANESTHESIA (OUTPATIENT)
Dept: GASTROENTEROLOGY | Facility: HOSPITAL | Age: 54
DRG: 073 | End: 2024-01-15
Payer: OTHER GOVERNMENT

## 2024-01-15 DIAGNOSIS — R11.14 BILIOUS VOMITING WITH NAUSEA: ICD-10-CM

## 2024-01-15 DIAGNOSIS — R11.2 INTRACTABLE NAUSEA AND VOMITING: ICD-10-CM

## 2024-01-15 LAB
ALBUMIN SERPL BCP-MCNC: 3.9 G/DL (ref 3.4–5)
ANION GAP SERPL CALC-SCNC: 21 MMOL/L (ref 10–20)
BUN SERPL-MCNC: 46 MG/DL (ref 6–23)
CALCIUM SERPL-MCNC: 8.9 MG/DL (ref 8.6–10.3)
CHLORIDE SERPL-SCNC: 92 MMOL/L (ref 98–107)
CO2 SERPL-SCNC: 22 MMOL/L (ref 21–32)
CREAT SERPL-MCNC: 8.4 MG/DL (ref 0.5–1.3)
EGFRCR SERPLBLD CKD-EPI 2021: 7 ML/MIN/1.73M*2
ERYTHROCYTE [DISTWIDTH] IN BLOOD BY AUTOMATED COUNT: 12.5 % (ref 11.5–14.5)
GLUCOSE BLD MANUAL STRIP-MCNC: 197 MG/DL (ref 74–99)
GLUCOSE BLD MANUAL STRIP-MCNC: 218 MG/DL (ref 74–99)
GLUCOSE BLD MANUAL STRIP-MCNC: 251 MG/DL (ref 74–99)
GLUCOSE BLD MANUAL STRIP-MCNC: 301 MG/DL (ref 74–99)
GLUCOSE BLD MANUAL STRIP-MCNC: 303 MG/DL (ref 74–99)
GLUCOSE SERPL-MCNC: 243 MG/DL (ref 74–99)
HCT VFR BLD AUTO: 41.5 % (ref 41–52)
HGB BLD-MCNC: 13.4 G/DL (ref 13.5–17.5)
MCH RBC QN AUTO: 30.3 PG (ref 26–34)
MCHC RBC AUTO-ENTMCNC: 32.3 G/DL (ref 32–36)
MCV RBC AUTO: 94 FL (ref 80–100)
NRBC BLD-RTO: 0 /100 WBCS (ref 0–0)
PHOSPHATE SERPL-MCNC: 5.1 MG/DL (ref 2.5–4.9)
PLATELET # BLD AUTO: 224 X10*3/UL (ref 150–450)
POTASSIUM SERPL-SCNC: 4.9 MMOL/L (ref 3.5–5.3)
RBC # BLD AUTO: 4.42 X10*6/UL (ref 4.5–5.9)
SODIUM SERPL-SCNC: 130 MMOL/L (ref 136–145)
WBC # BLD AUTO: 9.2 X10*3/UL (ref 4.4–11.3)

## 2024-01-15 PROCEDURE — 3700000001 HC GENERAL ANESTHESIA TIME - INITIAL BASE CHARGE

## 2024-01-15 PROCEDURE — 2500000001 HC RX 250 WO HCPCS SELF ADMINISTERED DRUGS (ALT 637 FOR MEDICARE OP): Performed by: INTERNAL MEDICINE

## 2024-01-15 PROCEDURE — 3700000002 HC GENERAL ANESTHESIA TIME - EACH INCREMENTAL 1 MINUTE

## 2024-01-15 PROCEDURE — 43239 EGD BIOPSY SINGLE/MULTIPLE: CPT

## 2024-01-15 PROCEDURE — 36415 COLL VENOUS BLD VENIPUNCTURE: CPT | Performed by: INTERNAL MEDICINE

## 2024-01-15 PROCEDURE — A43239 PR EDG TRANSORAL BIOPSY SINGLE/MULTIPLE: Performed by: ANESTHESIOLOGIST ASSISTANT

## 2024-01-15 PROCEDURE — 82947 ASSAY GLUCOSE BLOOD QUANT: CPT

## 2024-01-15 PROCEDURE — 88305 TISSUE EXAM BY PATHOLOGIST: CPT | Mod: TC,SUR,STJLAB | Performed by: INTERNAL MEDICINE

## 2024-01-15 PROCEDURE — 43239 EGD BIOPSY SINGLE/MULTIPLE: CPT | Performed by: INTERNAL MEDICINE

## 2024-01-15 PROCEDURE — 7100000001 HC RECOVERY ROOM TIME - INITIAL BASE CHARGE

## 2024-01-15 PROCEDURE — 2500000004 HC RX 250 GENERAL PHARMACY W/ HCPCS (ALT 636 FOR OP/ED): Performed by: ANESTHESIOLOGIST ASSISTANT

## 2024-01-15 PROCEDURE — 85027 COMPLETE CBC AUTOMATED: CPT | Performed by: INTERNAL MEDICINE

## 2024-01-15 PROCEDURE — 80069 RENAL FUNCTION PANEL: CPT | Performed by: INTERNAL MEDICINE

## 2024-01-15 PROCEDURE — 2500000002 HC RX 250 W HCPCS SELF ADMINISTERED DRUGS (ALT 637 FOR MEDICARE OP, ALT 636 FOR OP/ED): Performed by: INTERNAL MEDICINE

## 2024-01-15 PROCEDURE — 1100000001 HC PRIVATE ROOM DAILY

## 2024-01-15 PROCEDURE — 88305 TISSUE EXAM BY PATHOLOGIST: CPT | Performed by: PATHOLOGY

## 2024-01-15 PROCEDURE — 7100000002 HC RECOVERY ROOM TIME - EACH INCREMENTAL 1 MINUTE

## 2024-01-15 PROCEDURE — 2500000004 HC RX 250 GENERAL PHARMACY W/ HCPCS (ALT 636 FOR OP/ED): Performed by: INTERNAL MEDICINE

## 2024-01-15 PROCEDURE — A43239 PR EDG TRANSORAL BIOPSY SINGLE/MULTIPLE: Performed by: STUDENT IN AN ORGANIZED HEALTH CARE EDUCATION/TRAINING PROGRAM

## 2024-01-15 PROCEDURE — 99232 SBSQ HOSP IP/OBS MODERATE 35: CPT | Performed by: INTERNAL MEDICINE

## 2024-01-15 PROCEDURE — 2500000004 HC RX 250 GENERAL PHARMACY W/ HCPCS (ALT 636 FOR OP/ED): Performed by: PHYSICIAN ASSISTANT

## 2024-01-15 RX ORDER — SODIUM CHLORIDE, SODIUM LACTATE, POTASSIUM CHLORIDE, CALCIUM CHLORIDE 600; 310; 30; 20 MG/100ML; MG/100ML; MG/100ML; MG/100ML
INJECTION, SOLUTION INTRAVENOUS CONTINUOUS PRN
Status: DISCONTINUED | OUTPATIENT
Start: 2024-01-15 | End: 2024-01-15

## 2024-01-15 RX ORDER — PROPOFOL 10 MG/ML
INJECTION, EMULSION INTRAVENOUS CONTINUOUS PRN
Status: DISCONTINUED | OUTPATIENT
Start: 2024-01-15 | End: 2024-01-15

## 2024-01-15 RX ORDER — FENTANYL CITRATE 50 UG/ML
INJECTION, SOLUTION INTRAMUSCULAR; INTRAVENOUS AS NEEDED
Status: DISCONTINUED | OUTPATIENT
Start: 2024-01-15 | End: 2024-01-15

## 2024-01-15 RX ADMIN — ALUMINUM HYDROXIDE, MAGNESIUM HYDROXIDE, AND DIMETHICONE 10 ML: 200; 20; 200 SUSPENSION ORAL at 10:56

## 2024-01-15 RX ADMIN — SERTRALINE HYDROCHLORIDE 150 MG: 100 TABLET ORAL at 21:07

## 2024-01-15 RX ADMIN — PROPRANOLOL HYDROCHLORIDE 160 MG: 80 CAPSULE, EXTENDED RELEASE ORAL at 10:53

## 2024-01-15 RX ADMIN — Medication 6 MG: at 21:08

## 2024-01-15 RX ADMIN — FAMOTIDINE 20 MG: 20 TABLET ORAL at 23:58

## 2024-01-15 RX ADMIN — FENTANYL CITRATE 50 MCG: 50 INJECTION, SOLUTION INTRAMUSCULAR; INTRAVENOUS at 09:08

## 2024-01-15 RX ADMIN — SODIUM CHLORIDE, POTASSIUM CHLORIDE, SODIUM LACTATE AND CALCIUM CHLORIDE: 600; 310; 30; 20 INJECTION, SOLUTION INTRAVENOUS at 09:04

## 2024-01-15 RX ADMIN — METOCLOPRAMIDE 5 MG: 10 TABLET ORAL at 21:08

## 2024-01-15 RX ADMIN — AMLODIPINE BESYLATE 10 MG: 10 TABLET ORAL at 10:53

## 2024-01-15 RX ADMIN — LORATADINE 10 MG: 10 TABLET ORAL at 21:07

## 2024-01-15 RX ADMIN — DICYCLOMINE HYDROCHLORIDE 10 MG: 10 CAPSULE ORAL at 17:20

## 2024-01-15 RX ADMIN — INSULIN LISPRO 8 UNITS: 100 INJECTION, SOLUTION INTRAVENOUS; SUBCUTANEOUS at 17:24

## 2024-01-15 RX ADMIN — ALUMINUM HYDROXIDE, MAGNESIUM HYDROXIDE, AND DIMETHICONE 10 ML: 200; 20; 200 SUSPENSION ORAL at 17:20

## 2024-01-15 RX ADMIN — METOCLOPRAMIDE 5 MG: 10 TABLET ORAL at 17:23

## 2024-01-15 RX ADMIN — LIDOCAINE HYDROCHLORIDE 5 ML: 20 SOLUTION ORAL; TOPICAL at 23:44

## 2024-01-15 RX ADMIN — METOCLOPRAMIDE 5 MG: 10 TABLET ORAL at 10:52

## 2024-01-15 RX ADMIN — SENNOSIDES AND DOCUSATE SODIUM 2 TABLET: 8.6; 5 TABLET ORAL at 21:08

## 2024-01-15 RX ADMIN — MIRTAZAPINE 7.5 MG: 15 TABLET, FILM COATED ORAL at 21:09

## 2024-01-15 RX ADMIN — POLYETHYLENE GLYCOL 3350 17 G: 17 POWDER, FOR SOLUTION ORAL at 10:54

## 2024-01-15 RX ADMIN — Medication 2000 UNITS: at 23:59

## 2024-01-15 RX ADMIN — FUROSEMIDE 40 MG: 40 TABLET ORAL at 10:53

## 2024-01-15 RX ADMIN — Medication 2000 UNITS: at 10:52

## 2024-01-15 RX ADMIN — ATORVASTATIN CALCIUM 10 MG: 10 TABLET, FILM COATED ORAL at 23:59

## 2024-01-15 RX ADMIN — ROPINIROLE HYDROCHLORIDE 0.5 MG: 1 TABLET, FILM COATED ORAL at 21:07

## 2024-01-15 RX ADMIN — PROPOFOL 200 MCG/KG/MIN: 10 INJECTION, EMULSION INTRAVENOUS at 09:08

## 2024-01-15 RX ADMIN — PROCHLORPERAZINE EDISYLATE 10 MG: 5 INJECTION INTRAMUSCULAR; INTRAVENOUS at 10:57

## 2024-01-15 RX ADMIN — LEVOTHYROXINE SODIUM 175 MCG: 175 TABLET ORAL at 10:52

## 2024-01-15 RX ADMIN — DICYCLOMINE HYDROCHLORIDE 10 MG: 10 CAPSULE ORAL at 21:08

## 2024-01-15 RX ADMIN — PANTOPRAZOLE SODIUM 40 MG: 40 TABLET, DELAYED RELEASE ORAL at 17:23

## 2024-01-15 ASSESSMENT — PAIN - FUNCTIONAL ASSESSMENT
PAIN_FUNCTIONAL_ASSESSMENT: 0-10
PAIN_FUNCTIONAL_ASSESSMENT: UNABLE TO SELF-REPORT
PAIN_FUNCTIONAL_ASSESSMENT: UNABLE TO SELF-REPORT
PAIN_FUNCTIONAL_ASSESSMENT: 0-10

## 2024-01-15 ASSESSMENT — COGNITIVE AND FUNCTIONAL STATUS - GENERAL
DAILY ACTIVITIY SCORE: 24
DAILY ACTIVITIY SCORE: 24
MOBILITY SCORE: 24
MOBILITY SCORE: 24

## 2024-01-15 ASSESSMENT — PAIN SCALES - GENERAL
PAINLEVEL_OUTOF10: 0 - NO PAIN
PAIN_LEVEL: 0
PAINLEVEL_OUTOF10: 0 - NO PAIN

## 2024-01-15 NOTE — PROGRESS NOTES
Renal Progress Note  Assessment:  53 y.o. male with history s/f ESRD on IHD, HTN, T2DM, recurrent N/V, remote h/o thyroid cancer who was recently discharged after being managed for N/V and presents hours later for the same.     ESRD on IHD MWF via [facility], pt of Dr. Aleman, EDW 96 kg   Hypertensive urgency: improved  Recurrent N/V  Hypokalemia   Hypercalcemia   Lactic acidosis: improved   Anemia of renal disease  Secondary renal hyperparathyroidism     Plan:  - trial of reglan  - hd today  - ? If sensipar contributing.  Will stop this    Subjective:   Admit Date: 1/12/2024    Interval History: Patient s/p EGD.  Results noted.  Written for hd today.  Hasn't got it yet.  A      Medications:   Scheduled Meds:alum-mag hydroxide-simeth, 10 mL, oral, q6h  amLODIPine, 10 mg, oral, Daily  atorvastatin, 10 mg, oral, Every Mon/Wed/Fri  cholecalciferol, 2,000 Units, oral, Daily  cinacalcet, 30 mg, oral, Every Mon/Wed/Fri  cinacalcet, 30 mg, oral, Once  dicyclomine, 10 mg, oral, 4x daily  famotidine, 20 mg, oral, Every Mon/Wed/Fri  furosemide, 40 mg, oral, Daily  heparin, 2,000 Units, intravenous, After Dialysis  heparin, 4,000 Units, intravenous, After Dialysis  insulin lispro, 0-10 Units, subcutaneous, Before meals & nightly  levothyroxine, 175 mcg, oral, Daily  lidocaine, 5 mL, Mouth/Throat, q6h  loratadine, 10 mg, oral, Daily  melatonin, 6 mg, oral, Nightly  metoclopramide, 5 mg, oral, Before meals & nightly  mirtazapine, 7.5 mg, oral, Nightly  pantoprazole, 40 mg, oral, BID AC  polyethylene glycol, 17 g, oral, Daily  propranolol LA, 160 mg, oral, Daily  rOPINIRole, 0.5 mg, oral, Nightly  sennosides-docusate sodium, 2 tablet, oral, Daily  sertraline, 150 mg, oral, Daily  sucralfate, 1 g, oral, q6h REBEL      Continuous Infusions:     CBC:   Lab Results   Component Value Date    HGB 13.4 (L) 01/15/2024    HGB 13.0 (L) 01/14/2024    WBC 9.2 01/15/2024    WBC 9.4 01/14/2024     01/15/2024     01/14/2024     "  Anemia:  No results found for: \"FERRITIN\", \"IRON\", \"TIBC\"   BMP:    Lab Results   Component Value Date     (L) 01/15/2024     (L) 01/14/2024    K 4.9 01/15/2024    K 4.8 01/14/2024    CL 92 (L) 01/15/2024    CL 94 (L) 01/14/2024    CO2 22 01/15/2024    CO2 23 01/14/2024    BUN 46 (H) 01/15/2024    BUN 31 (H) 01/14/2024    CREATININE 8.40 (H) 01/15/2024    CREATININE 6.87 (H) 01/14/2024      Bone disease:   Lab Results   Component Value Date    PHOS 5.1 (H) 01/15/2024      Urinalysis:    Lab Results   Component Value Date    STONE 5.0 01/12/2024    PROTUR >=500 (3+) (N) 01/12/2024    GLUCOSEU 150 (2+) (A) 01/12/2024    BLOODU SMALL (1+) (A) 01/12/2024    KETONESU NEGATIVE 01/12/2024    BILIRUBINU NEGATIVE 01/12/2024    NITRITEU NEGATIVE 01/12/2024    LEUKOCYTESU NEGATIVE 01/12/2024        Objective:   Vitals: /74 (BP Location: Left arm, Patient Position: Lying)   Pulse 63   Temp 36.3 °C (97.3 °F) (Temporal)   Resp 14   Ht 1.778 m (5' 10\")   Wt 99.3 kg (218 lb 14.7 oz)   SpO2 98%   BMI 31.41 kg/m²    Wt Readings from Last 3 Encounters:   01/13/24 99.3 kg (218 lb 14.7 oz)   01/10/24 96 kg (211 lb 10.3 oz)   06/14/21 91.8 kg (202 lb 5 oz)      24HR INTAKE/OUTPUT:  No intake or output data in the 24 hours ending 01/15/24 1817    Admission weight:  Weight: 94.3 kg (208 lb 0.1 oz)      Constitutional:  Alert, awake, no apparent distress   Skin:normal, no rash  HEENT:sclera anicteric.  Head atraumatic normocephalic  Neck:supple with no thyromegally  Cardiovascular:  S1, S2 without m/r/g   Respiratory:  CTA B without w/r/r   Abdomen: +bs, soft, nt  Ext: no LE edema  Musculoskeletal:Intact  Neuro:Alert and oriented with no deficit      Electronically signed by Jaylan Aleman MD on 1/15/2024 at 6:17 PM            "

## 2024-01-15 NOTE — CARE PLAN
Problem: Discharge Planning  Goal: Discharge to home or other facility with appropriate resources  1/15/2024 0933 by Hammad Gregg RN  Outcome: Progressing  Flowsheets (Taken 1/15/2024 0933)  Discharge to home or other facility with appropriate resources: Identify barriers to discharge with patient and caregiver  1/15/2024 0933 by Hammad Gregg RN  Outcome: Progressing  Flowsheets (Taken 1/15/2024 0933)  Discharge to home or other facility with appropriate resources: Identify barriers to discharge with patient and caregiver   The patient's goals for the shift include      The clinical goals for the shift include Patient will remain free of all pain and discomforts until the end of the shift

## 2024-01-15 NOTE — CARE PLAN
Gastroenterology  Chart Update    EGD by Dr Back   The esophagus appeared normal.  Regular Z-line 38 cm from the incisors  The stomach appeared normal. Performed random biopsy using biopsy forceps to rule out H. pylori.  Edematous and erythematous mucosa in the duodenal bulb. Small 5mm ulceration;    PLAN  -Patient has listed allergy of omeprazole which causes nausea and vomiting.  Patient has been getting pantoprazole and seems to be tolerating at this point.  If he continues to tolerate recommend continuing 40 mg twice daily  -Okay for clear liquid diet and advance as tolerated  -Continue supportive care per primary team    Will need to follow-up in clinic for pathology results    The GI/Liver consult service will sign off. Please call if there are any questions, concerns, or change of patient's GI condition. Thank you.     Patient should follow up with his/her primary GI provider. If they do not have a GI provider they can follow up with  Gastroenterology [phone]     Denise Kuhn PA-C

## 2024-01-15 NOTE — PROGRESS NOTES
Met with pt at bedside to review his dc plan. Pt lives home alone, states he is independent.  Pt goes to dialysis M/W/F at [facility] on [address] in [place], chair time is 4:30pm. Pt drives himself to his dialysis. Has family that can assist with needs if needed. [facility]. Has a ride home upon dc.

## 2024-01-15 NOTE — ANESTHESIA POSTPROCEDURE EVALUATION
Patient: Duane Adams    Procedure Summary       Date: 01/15/24 Room / Location: [facility] [place] [facility]    Anesthesia Start: 0904 Anesthesia Stop: 0931    Procedure: EGD Diagnosis: Bilious vomiting with nausea    Scheduled Providers:  Responsible Provider: Catherine Yap MD    Anesthesia Type: MAC ASA Status: Not recorded            Anesthesia Type: MAC    Vitals Value Taken Time   /71 01/15/24 0928   Temp 36 01/15/24 0931   Pulse 78 01/15/24 0931   Resp 16 01/15/24 0931   SpO2 99 % 01/15/24 0929   Vitals shown include unvalidated device data.    Anesthesia Post Evaluation    Patient location during evaluation: PACU  Patient participation: complete - patient cannot participate  Level of consciousness: responsive to physical stimuli  Pain score: 0  Pain management: adequate  Multimodal analgesia pain management approach  Airway patency: patent  Two or more strategies used to mitigate risk of obstructive sleep apnea  Cardiovascular status: acceptable and stable  Respiratory status: acceptable and nasal cannula  Hydration status: acceptable  Postoperative Nausea and Vomiting: none        No notable events documented.

## 2024-01-15 NOTE — PROGRESS NOTES
"Duane Adams is a 53 y.o. male on day 1 of admission presenting with Nausea & vomiting.    Subjective   Seen and examined today after EGD, still having nausea no vomiting, complaining of mild epigastric pain, reported 50% improvement since admission.    Objective     Physical Exam  Constitutional awake/alert/oriented x3, mild distress, alert and cooperative   ENMT: mucous membranes moist   Head/Neck: Neck supple   Respiratory/Thorax: Patent airways, CTAB.   Cardiovascular: Regular, rate and rhythm, no murmurs, normal S 1and S 2   Gastrointestinal: Nondistended, soft, positive bowel sounds, mild epigastric tenderness   Musculoskeletal: ROM intact, no joint swelling, normal strength   Extremities: normal extremities, no edema   Neurological: alert and oriented x3, intact senses, motor, response and reflexes, normal strength         Last Recorded Vitals  Blood pressure 156/74, pulse 63, temperature 36.3 °C (97.3 °F), temperature source Temporal, resp. rate 14, height 1.778 m (5' 10\"), weight 99.3 kg (218 lb 14.7 oz), SpO2 98 %.  Intake/Output last 3 Shifts:  I/O last 3 completed shifts:  In: 240 (2.4 mL/kg) [P.O.:240]  Out: - (0 mL/kg)   Weight: 99.3 kg     Relevant Results  Scheduled medications  alum-mag hydroxide-simeth, 10 mL, oral, q6h  amLODIPine, 10 mg, oral, Daily  atorvastatin, 10 mg, oral, Every Mon/Wed/Fri  cholecalciferol, 2,000 Units, oral, Daily  cinacalcet, 30 mg, oral, Every Mon/Wed/Fri  cinacalcet, 30 mg, oral, Once  dicyclomine, 10 mg, oral, 4x daily  famotidine, 20 mg, oral, Every Mon/Wed/Fri  furosemide, 40 mg, oral, Daily  heparin, 2,000 Units, intravenous, After Dialysis  heparin, 4,000 Units, intravenous, After Dialysis  insulin lispro, 0-10 Units, subcutaneous, Before meals & nightly  levothyroxine, 175 mcg, oral, Daily  lidocaine, 5 mL, Mouth/Throat, q6h  loratadine, 10 mg, oral, Daily  melatonin, 6 mg, oral, Nightly  metoclopramide, 5 mg, oral, Before meals & nightly  mirtazapine, 7.5 mg, " oral, Nightly  pantoprazole, 40 mg, oral, BID AC  polyethylene glycol, 17 g, oral, Daily  propranolol LA, 160 mg, oral, Daily  rOPINIRole, 0.5 mg, oral, Nightly  sennosides-docusate sodium, 2 tablet, oral, Daily  sertraline, 150 mg, oral, Daily  sucralfate, 1 g, oral, q6h REBEL      Continuous medications     PRN medications  PRN medications: acetaminophen, dextrose 10 % in water (D10W), dextrose, glucagon, hydrOXYzine HCL, LORazepam, prochlorperazine **OR** prochlorperazine **OR** prochlorperazine  Results from last 7 days   Lab Units 01/15/24  1130 01/14/24  0615 01/13/24  1558   WBC AUTO x10*3/uL 9.2 9.4 8.4   RBC AUTO x10*6/uL 4.42* 4.30* 4.56   HEMOGLOBIN g/dL 13.4* 13.0* 14.1       Results from last 7 days   Lab Units 01/15/24  1130 01/14/24  0615 01/13/24  1558 01/13/24  0613 01/12/24  0206 01/11/24  0643 01/10/24  1310   SODIUM mmol/L 130* 131* 135* 137 137 138 138   POTASSIUM mmol/L 4.9 4.8 4.3 4.1 3.4* 4.0 5.2   CHLORIDE mmol/L 92* 94* 97* 98 92* 99 100   CO2 mmol/L 22 23 25 26 24 24 23   BUN mg/dL 46* 31* 23 47* 36* 50* 77*   CREATININE mg/dL 8.40* 6.87* 5.54* 8.88* 6.61* 7.66* 9.60*   CALCIUM mg/dL 8.9 9.2 9.1 9.7 10.7* 9.0 9.2   PHOSPHORUS mg/dL 5.1* 4.5 3.5  --   --   --   --    MAGNESIUM mg/dL  --   --   --  2.54* 2.22 2.44*  --    BILIRUBIN TOTAL mg/dL  --   --   --   --  0.8  --  0.6   ALT U/L  --   --   --   --  22  --  20   AST U/L  --   --   --   --  28  --  30           Assessment/Plan   Principal Problem:    Nausea & vomiting  Active Problems:    Intractable nausea and vomiting  End-stage renal disease on hemodialysis  Diabetes  Hypertension  Hyperlipidemia  Hypokalemia  Lactic acidosis     -Vital signs stable this morning  -EGD was done today esophagus and stomach appears normal, biopsy was taken to rule out H. pylori, edematous and erythematous mucosa in the duodenal bulb, patient was started on pantoprazole 40 mg twice daily  -CT scan was done yesterday which showed wall thickening and mild  fat stranding involving the urinary bladder, correlate with cystitis, however urine culture came back negative, no need for antibiotic, 4 mm stable right lower lobe pulmonary nodule.  - Carafate and  Maalox, didn't help  -Continue Compazine for nausea, continue bowel regimen  -Diet advanced as tolerated  -Nephrology consulted, on dialysis.  -Home medication reviewed and resumed  -Full code  -DVT prophylaxis low risk        I spent 35 minutes in the professional and overall care of this patient.      Spenser Gaitan MD

## 2024-01-15 NOTE — ANESTHESIA PREPROCEDURE EVALUATION
Patient: Duane Adams    Procedure Information       Anesthesia Start Date/Time: 01/15/24 0904    Procedure: EGD    Location: [facility]            Relevant Problems   Anesthesia (within normal limits)      Cardiovascular   (+) Other hyperlipidemia   (+) Primary hypertension      Endocrine   (+) Controlled type 2 diabetes mellitus with complication, with long-term current use of insulin (CMS/[facility])      /Renal   (+) ESRD on dialysis (CMS/[facility])       Clinical information reviewed:   Tobacco  Allergies  Meds   Med Hx  Surg Hx   Fam Hx  Soc Hx        NPO Detail:  No data recorded     Physical Exam    Airway  Mallampati: II  TM distance: >3 FB  Neck ROM: full     Cardiovascular   Rhythm: regular  Rate: normal     Dental    Pulmonary   Breath sounds clear to auscultation     Abdominal   Abdomen: soft             Anesthesia Plan    History of general anesthesia?: yes  History of complications of general anesthesia?: no    ASA 2     general     intravenous induction   Postoperative administration of opioids is intended.  Anesthetic plan and risks discussed with patient.    Plan discussed with CAA, CRNA and attending.

## 2024-01-16 ENCOUNTER — APPOINTMENT (OUTPATIENT)
Dept: DIALYSIS | Facility: HOSPITAL | Age: 54
End: 2024-01-16
Payer: OTHER GOVERNMENT

## 2024-01-16 LAB
ALBUMIN SERPL BCP-MCNC: 4.1 G/DL (ref 3.4–5)
ANION GAP SERPL CALC-SCNC: 18 MMOL/L (ref 10–20)
BUN SERPL-MCNC: 26 MG/DL (ref 6–23)
CALCIUM SERPL-MCNC: 9.2 MG/DL (ref 8.6–10.3)
CHLORIDE SERPL-SCNC: 94 MMOL/L (ref 98–107)
CO2 SERPL-SCNC: 26 MMOL/L (ref 21–32)
CREAT SERPL-MCNC: 6.03 MG/DL (ref 0.5–1.3)
EGFRCR SERPLBLD CKD-EPI 2021: 10 ML/MIN/1.73M*2
GLUCOSE BLD MANUAL STRIP-MCNC: 121 MG/DL (ref 74–99)
GLUCOSE BLD MANUAL STRIP-MCNC: 187 MG/DL (ref 74–99)
GLUCOSE BLD MANUAL STRIP-MCNC: 262 MG/DL (ref 74–99)
GLUCOSE SERPL-MCNC: 115 MG/DL (ref 74–99)
HOLD SPECIMEN: NORMAL
PHOSPHATE SERPL-MCNC: 3.7 MG/DL (ref 2.5–4.9)
POTASSIUM SERPL-SCNC: 3.9 MMOL/L (ref 3.5–5.3)
SODIUM SERPL-SCNC: 134 MMOL/L (ref 136–145)

## 2024-01-16 PROCEDURE — 2500000004 HC RX 250 GENERAL PHARMACY W/ HCPCS (ALT 636 FOR OP/ED): Performed by: STUDENT IN AN ORGANIZED HEALTH CARE EDUCATION/TRAINING PROGRAM

## 2024-01-16 PROCEDURE — 99232 SBSQ HOSP IP/OBS MODERATE 35: CPT

## 2024-01-16 PROCEDURE — 2500000004 HC RX 250 GENERAL PHARMACY W/ HCPCS (ALT 636 FOR OP/ED): Performed by: INTERNAL MEDICINE

## 2024-01-16 PROCEDURE — 2500000004 HC RX 250 GENERAL PHARMACY W/ HCPCS (ALT 636 FOR OP/ED): Performed by: PHYSICIAN ASSISTANT

## 2024-01-16 PROCEDURE — 82947 ASSAY GLUCOSE BLOOD QUANT: CPT

## 2024-01-16 PROCEDURE — 80069 RENAL FUNCTION PANEL: CPT | Performed by: INTERNAL MEDICINE

## 2024-01-16 PROCEDURE — 1100000001 HC PRIVATE ROOM DAILY

## 2024-01-16 PROCEDURE — 36415 COLL VENOUS BLD VENIPUNCTURE: CPT | Performed by: INTERNAL MEDICINE

## 2024-01-16 PROCEDURE — 2500000002 HC RX 250 W HCPCS SELF ADMINISTERED DRUGS (ALT 637 FOR MEDICARE OP, ALT 636 FOR OP/ED): Performed by: INTERNAL MEDICINE

## 2024-01-16 PROCEDURE — 2500000001 HC RX 250 WO HCPCS SELF ADMINISTERED DRUGS (ALT 637 FOR MEDICARE OP): Performed by: INTERNAL MEDICINE

## 2024-01-16 PROCEDURE — 8010000001 HC DIALYSIS - HEMODIALYSIS PER DAY

## 2024-01-16 RX ADMIN — HYDROXYZINE HYDROCHLORIDE 50 MG: 50 TABLET, FILM COATED ORAL at 18:46

## 2024-01-16 RX ADMIN — POLYETHYLENE GLYCOL 3350 17 G: 17 POWDER, FOR SOLUTION ORAL at 09:45

## 2024-01-16 RX ADMIN — PROCHLORPERAZINE MALEATE 10 MG: 10 TABLET ORAL at 00:28

## 2024-01-16 RX ADMIN — LORATADINE 10 MG: 10 TABLET ORAL at 21:26

## 2024-01-16 RX ADMIN — METOCLOPRAMIDE 5 MG: 10 TABLET ORAL at 21:27

## 2024-01-16 RX ADMIN — LEVOTHYROXINE SODIUM 175 MCG: 175 TABLET ORAL at 09:45

## 2024-01-16 RX ADMIN — Medication 6 MG: at 21:28

## 2024-01-16 RX ADMIN — FUROSEMIDE 40 MG: 40 TABLET ORAL at 09:45

## 2024-01-16 RX ADMIN — Medication 2000 UNITS: at 21:27

## 2024-01-16 RX ADMIN — METOCLOPRAMIDE 5 MG: 10 TABLET ORAL at 17:51

## 2024-01-16 RX ADMIN — DICYCLOMINE HYDROCHLORIDE 10 MG: 10 CAPSULE ORAL at 09:45

## 2024-01-16 RX ADMIN — METOCLOPRAMIDE 5 MG: 10 TABLET ORAL at 09:44

## 2024-01-16 RX ADMIN — SENNOSIDES AND DOCUSATE SODIUM 2 TABLET: 8.6; 5 TABLET ORAL at 21:28

## 2024-01-16 RX ADMIN — METOCLOPRAMIDE 5 MG: 10 TABLET ORAL at 11:00

## 2024-01-16 RX ADMIN — MIRTAZAPINE 7.5 MG: 15 TABLET, FILM COATED ORAL at 21:27

## 2024-01-16 RX ADMIN — ROPINIROLE HYDROCHLORIDE 0.5 MG: 1 TABLET, FILM COATED ORAL at 21:26

## 2024-01-16 RX ADMIN — HEPARIN SODIUM 4000 UNITS: 1000 INJECTION INTRAVENOUS; SUBCUTANEOUS at 01:28

## 2024-01-16 RX ADMIN — DICYCLOMINE HYDROCHLORIDE 10 MG: 10 CAPSULE ORAL at 21:26

## 2024-01-16 RX ADMIN — INSULIN LISPRO 6 UNITS: 100 INJECTION, SOLUTION INTRAVENOUS; SUBCUTANEOUS at 00:00

## 2024-01-16 RX ADMIN — HEPARIN SODIUM 2000 UNITS: 1000 INJECTION INTRAVENOUS; SUBCUTANEOUS at 02:55

## 2024-01-16 RX ADMIN — PANTOPRAZOLE SODIUM 40 MG: 40 TABLET, DELAYED RELEASE ORAL at 09:45

## 2024-01-16 RX ADMIN — LIDOCAINE HYDROCHLORIDE 5 ML: 20 SOLUTION ORAL; TOPICAL at 17:52

## 2024-01-16 RX ADMIN — SUCRALFATE 1 G: 1 SUSPENSION ORAL at 09:45

## 2024-01-16 RX ADMIN — PROPRANOLOL HYDROCHLORIDE 160 MG: 80 CAPSULE, EXTENDED RELEASE ORAL at 09:45

## 2024-01-16 RX ADMIN — INSULIN LISPRO 6 UNITS: 100 INJECTION, SOLUTION INTRAVENOUS; SUBCUTANEOUS at 21:22

## 2024-01-16 RX ADMIN — INSULIN LISPRO 2 UNITS: 100 INJECTION, SOLUTION INTRAVENOUS; SUBCUTANEOUS at 12:12

## 2024-01-16 RX ADMIN — ALUMINUM HYDROXIDE, MAGNESIUM HYDROXIDE, AND DIMETHICONE 10 ML: 200; 20; 200 SUSPENSION ORAL at 12:12

## 2024-01-16 RX ADMIN — DICYCLOMINE HYDROCHLORIDE 10 MG: 10 CAPSULE ORAL at 17:51

## 2024-01-16 RX ADMIN — SERTRALINE HYDROCHLORIDE 150 MG: 100 TABLET ORAL at 21:26

## 2024-01-16 RX ADMIN — AMLODIPINE BESYLATE 10 MG: 10 TABLET ORAL at 09:45

## 2024-01-16 RX ADMIN — DICYCLOMINE HYDROCHLORIDE 10 MG: 10 CAPSULE ORAL at 12:12

## 2024-01-16 RX ADMIN — PANTOPRAZOLE SODIUM 40 MG: 40 TABLET, DELAYED RELEASE ORAL at 17:51

## 2024-01-16 ASSESSMENT — PAIN - FUNCTIONAL ASSESSMENT
PAIN_FUNCTIONAL_ASSESSMENT: 0-10
PAIN_FUNCTIONAL_ASSESSMENT: 0-10
PAIN_FUNCTIONAL_ASSESSMENT: NO/DENIES PAIN
PAIN_FUNCTIONAL_ASSESSMENT: NO/DENIES PAIN

## 2024-01-16 ASSESSMENT — COGNITIVE AND FUNCTIONAL STATUS - GENERAL
MOBILITY SCORE: 24
DAILY ACTIVITIY SCORE: 24
DAILY ACTIVITIY SCORE: 24
MOBILITY SCORE: 24

## 2024-01-16 ASSESSMENT — PAIN SCALES - GENERAL
PAINLEVEL_OUTOF10: 0 - NO PAIN
PAINLEVEL_OUTOF10: 2

## 2024-01-16 NOTE — CARE PLAN
The patient's goals for the shift include      The clinical goals for the shift include Nausea relief      Problem: Diabetes  Goal: Maintain electrolyte levels within acceptable range throughout shift  Outcome: Progressing  Goal: Maintain glucose levels >70mg/dl to <250mg/dl throughout shift  Outcome: Progressing     Problem: Safety - Adult  Goal: Free from fall injury  Outcome: Progressing     Problem: Discharge Planning  Goal: Discharge to home or other facility with appropriate resources  Outcome: Progressing     Problem: Chronic Conditions and Co-morbidities  Goal: Patient's chronic conditions and co-morbidity symptoms are monitored and maintained or improved  Outcome: Progressing     Problem: ESRD: Dialysis, CAPD  Goal: Electrolytes restored to baseline  Outcome: Progressing  Goal: Fatigue of chronic illness managed  Outcome: Progressing  Goal: Follows dialysis treatment plan  Outcome: Progressing

## 2024-01-16 NOTE — PROGRESS NOTES
Duane Adams is a 53 y.o. male on day 2 of admission presenting with Nausea & vomiting.      Subjective   Patient was seen and examined this morning. Reports he had no episodes of vomiting, however is still nauseous. Reports he ate some crackers this morning. Patient did not order lunch, requested patient to order lunch and see if patient is able to tolerate diet.       Objective     Last Recorded Vitals  /74 (BP Location: Left arm, Patient Position: Lying)   Pulse 66   Temp 36.8 °C (98.2 °F) (Temporal)   Resp 19   Wt 99.3 kg (218 lb 14.7 oz)   SpO2 98%   Intake/Output last 3 Shifts:    Intake/Output Summary (Last 24 hours) at 1/16/2024 1305  Last data filed at 1/16/2024 0450  Gross per 24 hour   Intake 1600 ml   Output 4000 ml   Net -2400 ml       Admission Weight  Weight: 94.3 kg (208 lb 0.1 oz) (01/12/24 0029)    Daily Weight  01/13/24 : 99.3 kg (218 lb 14.7 oz)    Image Results  ECG 12 lead  Normal sinus rhythm  Normal ECG  When compared with ECG of 10-GLENN-2024 13:22,  No significant change was found  EGD  Table formatting from the original result was not included.  Impression  The esophagus appeared normal.  The stomach appeared normal. Performed random biopsy to rule out H.   pylori.  Edematous and erythematous mucosa in the duodenal bulb    Findings  The esophagus appeared normal.  Regular Z-line 38 cm from the incisors  The stomach appeared normal. Performed random biopsy using biopsy forceps   to rule out H. pylori.  Edematous and erythematous mucosa in the duodenal bulb. Small 5mm   ulceration;    Recommendation   Await pathology results    Follow up with PCP    Continue BID PPI if tolerated       Indication  Bilious vomiting with nausea    Staff  Staff Role   No Staff Documented     Medications  See Anesthesia Record.     Preprocedure  A history and physical has been performed, and patient medication   allergies have been reviewed. The patient's tolerance of previous   anesthesia has been  reviewed. The risks and benefits of the procedure and   the sedation options and risks were discussed with the patient. All   questions were answered and informed consent obtained.    Details of the Procedure  The patient underwent monitored anesthesia care, which was administered by   an anesthesia professional. The patient's blood pressure, ECG, ETCO2,   heart rate, level of consciousness, oxygen and respirations were monitored   throughout the procedure. The scope was introduced through the mouth and   advanced to the second part of the duodenum. Retroflexion was performed in   the cardia. The patient's estimated blood loss was minimal (<5 mL). The   procedure was not difficult. The patient tolerated the procedure well.   There were no apparent adverse events.     Events  Procedure Events   Event Event Time   ENDO SCOPE IN TIME 1/15/2024  9:12 AM   ENDO SCOPE OUT TIME 1/15/2024  9:19 AM     Specimens  ID Type Source Tests Collected by Time   1 : ANTRUM AND BODY BIOPSY R/O HPYLORI AND PATH Tissue STOMACH BODY/CORPUS   BIOPSY SURGICAL PATHOLOGY EXAM Estiven Serrano MA 1/15/2024 0917     Procedure Location  [address] [address]  [phone]    Referring Provider  No referring provider defined for this encounter.    Procedure Provider  No name on file      Physical Exam  General: well appearing, no acute distress  HEENT:  moist mucous membranes  CV: regular rate and rhythm, no murmurs, 2+ pulses in all extremities  RESP: CTA bilaterally, normal chest expansion, no resp distress  Abd: soft, nontender, nondistended  Neuro: alert and oriented x3, speech appropriate  Vascular: no peripheral edema appreciated  Skin: no rashes  MSK: no joint swelling    Relevant Results    Scheduled medications  alum-mag hydroxide-simeth, 10 mL, oral, q6h  amLODIPine, 10 mg, oral, Daily  atorvastatin, 10 mg, oral, Every Mon/Wed/Fri  cholecalciferol, 2,000  Units, oral, Daily  dicyclomine, 10 mg, oral, 4x daily  famotidine, 20 mg, oral, Every Mon/Wed/Fri  furosemide, 40 mg, oral, Daily  heparin, 2,000 Units, intravenous, After Dialysis  heparin, 4,000 Units, intravenous, After Dialysis  insulin lispro, 0-10 Units, subcutaneous, Before meals & nightly  levothyroxine, 175 mcg, oral, Daily  lidocaine, 5 mL, Mouth/Throat, q6h  loratadine, 10 mg, oral, Daily  melatonin, 6 mg, oral, Nightly  metoclopramide, 5 mg, oral, Before meals & nightly  mirtazapine, 7.5 mg, oral, Nightly  pantoprazole, 40 mg, oral, BID AC  polyethylene glycol, 17 g, oral, Daily  propranolol LA, 160 mg, oral, Daily  rOPINIRole, 0.5 mg, oral, Nightly  sennosides-docusate sodium, 2 tablet, oral, Daily  sertraline, 150 mg, oral, Daily  sucralfate, 1 g, oral, q6h REBEL      Continuous medications     PRN medications  PRN medications: acetaminophen, dextrose 10 % in water (D10W), dextrose, glucagon, hydrOXYzine HCL, LORazepam, prochlorperazine **OR** prochlorperazine **OR** prochlorperazine     Results for orders placed or performed during the hospital encounter of 01/12/24 (from the past 24 hour(s))   POCT GLUCOSE   Result Value Ref Range    POCT Glucose 301 (H) 74 - 99 mg/dL   POCT GLUCOSE   Result Value Ref Range    POCT Glucose 251 (H) 74 - 99 mg/dL   Lavender Top   Result Value Ref Range    Extra Tube Hold for add-ons.    Renal function panel   Result Value Ref Range    Glucose 115 (H) 74 - 99 mg/dL    Sodium 134 (L) 136 - 145 mmol/L    Potassium 3.9 3.5 - 5.3 mmol/L    Chloride 94 (L) 98 - 107 mmol/L    Bicarbonate 26 21 - 32 mmol/L    Anion Gap 18 10 - 20 mmol/L    Urea Nitrogen 26 (H) 6 - 23 mg/dL    Creatinine 6.03 (H) 0.50 - 1.30 mg/dL    eGFR 10 (L) >60 mL/min/1.73m*2    Calcium 9.2 8.6 - 10.3 mg/dL    Phosphorus 3.7 2.5 - 4.9 mg/dL    Albumin 4.1 3.4 - 5.0 g/dL   POCT GLUCOSE   Result Value Ref Range    POCT Glucose 121 (H) 74 - 99 mg/dL   POCT GLUCOSE   Result Value Ref Range    POCT Glucose 187  (H) 74 - 99 mg/dL      ECG 12 lead    Result Date: 1/15/2024  Normal sinus rhythm Normal ECG When compared with ECG of 10-GLENN-2024 13:22, No significant change was found    EGD    Result Date: 1/15/2024  Table formatting from the original result was not included. Impression The esophagus appeared normal. The stomach appeared normal. Performed random biopsy to rule out H. pylori. Edematous and erythematous mucosa in the duodenal bulb Findings The esophagus appeared normal. Regular Z-line 38 cm from the incisors The stomach appeared normal. Performed random biopsy using biopsy forceps to rule out H. pylori. Edematous and erythematous mucosa in the duodenal bulb. Small 5mm ulceration; Recommendation  Await pathology results  Follow up with PCP  Continue BID PPI if tolerated  Indication Bilious vomiting with nausea Staff Staff Role No Staff Documented Medications See Anesthesia Record. Preprocedure A history and physical has been performed, and patient medication allergies have been reviewed. The patient's tolerance of previous anesthesia has been reviewed. The risks and benefits of the procedure and the sedation options and risks were discussed with the patient. All questions were answered and informed consent obtained. Details of the Procedure The patient underwent monitored anesthesia care, which was administered by an anesthesia professional. The patient's blood pressure, ECG, ETCO2, heart rate, level of consciousness, oxygen and respirations were monitored throughout the procedure. The scope was introduced through the mouth and advanced to the second part of the duodenum. Retroflexion was performed in the cardia. The patient's estimated blood loss was minimal (<5 mL). The procedure was not difficult. The patient tolerated the procedure well. There were no apparent adverse events. Events Procedure Events Event Event Time ENDO SCOPE IN TIME 1/15/2024  9:12 AM ENDO SCOPE OUT TIME 1/15/2024  9:19 AM Specimens ID Type  Source Tests Collected by Time 1 : ANTRUM AND BODY BIOPSY R/O HPYLORI AND PATH Tissue STOMACH BODY/CORPUS BIOPSY SURGICAL PATHOLOGY EXAM Estiven Serrano MA 1/15/2024 0917 Procedure [address] [place] [address] [phone] Referring Provider No referring provider defined for this encounter. Procedure Provider No name on file     CT abdomen pelvis wo IV contrast    Result Date: 1/13/2024  Interpreted By:  Ana Hutchison, STUDY: CT ABDOMEN PELVIS WO IV CONTRAST;  1/13/2024 5:20 pm   INDICATION: Signs/Symptoms:N,V, abdominal pain.   COMPARISON: 01/10/2024   ACCESSION NUMBER(S): [id number]   ORDERING CLINICIAN: TEAGAN CORCORAN   TECHNIQUE: Axial noncontrast CT images of the abdomen and pelvis with coronal and sagittal reconstructed images.   FINDINGS: LOWER CHEST: Stable 4 mm right lower lobe. BONES: No acute osseous abnormality. Mild diffuse degenerative disc changes. ABDOMINAL WALL: Within normal limits.   ABDOMEN: Lack of intravenous contrast limits evaluation of vessels and solid organs. LIVER: Within normal limits. BILE DUCTS: No biliary dilatation. GALLBLADDER: No calcified gallstones. No pericholecystic inflammatory changes. PANCREAS: No peripancreatic inflammatory stranding or duct dilatation. SPLEEN: Within normal limits. ADRENALS: Within normal limits.   KIDNEYS, URETERS, URINARY BLADDER: No hydronephrosis or urinary tract calculus.  The urinary bladder is nondistended and there is diffuse bladder wall thickening and surrounding fat stranding. Intraluminal density throughout the urinary bladder may be residual contrast from the recent CT.   VESSELS: No aortic aneurysm. RETROPERITONEUM: No pathologically enlarged lymph nodes.   PELVIS:   REPRODUCTIVE ORGANS: No abnormality, given limitations of the noncontrast CT.  No significant free pelvic fluid.   BOWEL: The stomach is mildly distended. No dilated small bowel. The colon is  unremarkable. The appendix is not seen. No pericecal inflammatory changes to suggest acute appendicitis. PERITONEUM: No ascites or free air, no fluid collection.       Wall thickening and mild fat stranding involving the urinary bladder. Please correlate clinically to exclude cystitis. Intraluminal density throughout the urinary bladder is probably diluted contrast from recent CT. Hemorrhage may appear similar.   4 mm stable right lower lobe pulmonary nodule. If the patient has no risk factors for cancer, such as smoking, no follow-up is necessary. If the patient has risk factors, a follow-up chest CT in 12 months may be considered.   MACRO: None   Signed by: Ana Hutchison 1/13/2024 11:00 PM Dictation workstation:   [id number]    ECG 12 lead    Result Date: 1/12/2024  Normal sinus rhythm Normal ECG When compared with ECG of 20-JAN-2022 10:20, No significant change was found Confirmed by Aakash Levy ([id number]) on 1/12/2024 4:17:41 PM    CT abdomen pelvis w IV contrast    Result Date: 1/10/2024  Interpreted By:  Schoenberger, Joseph, STUDY: CT ABDOMEN PELVIS W IV CONTRAST;  1/10/2024 3:11 pm   INDICATION: Signs/Symptoms:abd pain n/v.   COMPARISON: 01/19/2021   ACCESSION NUMBER(S): [id number]   ORDERING CLINICIAN: EDUARD BAKER   TECHNIQUE: CT of the abdomen and pelvis was performed.  Standard contiguous axial images were obtained at 3 mm slice thickness through the abdomen and pelvis. Coronal and sagittal reconstructions at 3 mm slice thickness were performed.   75 ml of contrast Omnipaque 350 were administered intravenously without immediate complication.   FINDINGS: LOWER CHEST: There are some minimal areas of platelike atelectasis in both lung bases. There is a small hiatus hernia. Otherwise unremarkable.   ABDOMEN:   LIVER: Within normal limits.   BILE DUCTS: Normal caliber.   GALLBLADDER: No calcified stones. No wall thickening.   PANCREAS: Within normal limits.   SPLEEN: Within normal limits.   ADRENAL GLANDS:  Bilateral adrenal glands appear normal.   KIDNEYS AND URETERS: There is cortical atrophy of both kidneys. Otherwise the enhanced symmetrically. No hydroureteronephrosis or nephroureterolithiasis is identified.   PELVIS:   BLADDER: Abnormal appearing with mild mural thickening but considerable infiltration of the adjacent fat. Consider cystitis.   REPRODUCTIVE ORGANS: There is calcification of the vas deferens. This is a finding that is often associated with diabetes mellitus. Correlate with known history is.   BOWEL: The stomach is unremarkable.   The small and large bowel are normal in caliber and demonstrate no wall thickening.   The colon is relatively collapsed along its course. The appendix is not seen   VESSELS: There is no aneurysmal dilatation of the abdominal aorta. The IVC appears normal.   PERITONEUM/RETROPERITONEUM/LYMPH NODES: No ascites or free air, no fluid collection.  No abdominopelvic lymphadenopathy is present.   BONES AND ABDOMINAL WALL: No suspicious osseous lesions are identified.  Abdominal wall structures appear intact.       1.  Bilateral symmetric renal cortical atrophy. No hydronephrosis. 2. Possible cystitis. Correlate clinically. 3. Calcification of the vas deferens which could be associated with diabetes mellitus     MACRO: None   Signed by: Joseph Schoenberger 1/10/2024 3:20 PM Dictation workstation:   [id number]    XR chest 1 view    Result Date: 1/10/2024  Interpreted By:  Nory Diaz, STUDY: XR CHEST 1 VIEW;  1/10/2024 1:27 pm   INDICATION: Signs/Symptoms:retching.   COMPARISON: None.   ACCESSION NUMBER(S): [id number]   ORDERING CLINICIAN: PEE CAUSEY   FINDINGS: No consolidation. No pleural effusion or pneumothorax. Central pulmonary vascular congestion. Normal heart size. No acute osseous abnormality.       Central pulmonary vascular congestion.   Signed by: Nory Diaz 1/10/2024 2:04 PM Dictation workstation:   [id number]           Assessment/Plan      Principal Problem:    Nausea & vomiting  Active Problems:    Intractable nausea and vomiting    -Vital signs stable this morning, symptoms: nausea, denied further episodes of vomiting   -EGD was done 1/15/23: Esophagus and stomach appears normal, biopsy was taken to rule out H. pylori, edematous and erythematous mucosa in the duodenal bulb, patient was started on pantoprazole 40 mg twice daily  -CT scan was done on 1/14/23:  showed wall thickening and mild fat stranding involving the urinary bladder, correlate with cystitis, however urine culture came back negative, no need for antibiotic, 4 mm stable right lower lobe pulmonary nodule.  -Carafate and  Maalox, didn't help  -Continue Compazine for nausea, continue bowel regimen  -Diet advanced as tolerated  -Nephrology consulted, on dialysis.  -Home medication reviewed and resumed  -Full code  -DVT prophylaxis low risk    Naima Rader DO  Case and plan discussed with attending  Internal Medicine, PGY3

## 2024-01-17 ENCOUNTER — APPOINTMENT (OUTPATIENT)
Dept: DIALYSIS | Facility: HOSPITAL | Age: 54
End: 2024-01-17
Payer: OTHER GOVERNMENT

## 2024-01-17 VITALS
WEIGHT: 218.92 LBS | SYSTOLIC BLOOD PRESSURE: 101 MMHG | OXYGEN SATURATION: 99 % | HEART RATE: 62 BPM | BODY MASS INDEX: 31.34 KG/M2 | RESPIRATION RATE: 18 BRPM | DIASTOLIC BLOOD PRESSURE: 50 MMHG | TEMPERATURE: 97.3 F | HEIGHT: 70 IN

## 2024-01-17 LAB
ALBUMIN SERPL BCP-MCNC: 3.6 G/DL (ref 3.4–5)
ANION GAP SERPL CALC-SCNC: 21 MMOL/L (ref 10–20)
BUN SERPL-MCNC: 37 MG/DL (ref 6–23)
CALCIUM SERPL-MCNC: 9.2 MG/DL (ref 8.6–10.3)
CHLORIDE SERPL-SCNC: 92 MMOL/L (ref 98–107)
CO2 SERPL-SCNC: 22 MMOL/L (ref 21–32)
CREAT SERPL-MCNC: 7.54 MG/DL (ref 0.5–1.3)
EGFRCR SERPLBLD CKD-EPI 2021: 8 ML/MIN/1.73M*2
ERYTHROCYTE [DISTWIDTH] IN BLOOD BY AUTOMATED COUNT: 12.6 % (ref 11.5–14.5)
GLUCOSE BLD MANUAL STRIP-MCNC: 113 MG/DL (ref 74–99)
GLUCOSE BLD MANUAL STRIP-MCNC: 222 MG/DL (ref 74–99)
GLUCOSE SERPL-MCNC: 205 MG/DL (ref 74–99)
HCT VFR BLD AUTO: 42.8 % (ref 41–52)
HGB BLD-MCNC: 14 G/DL (ref 13.5–17.5)
LABORATORY COMMENT REPORT: NORMAL
MCH RBC QN AUTO: 30.2 PG (ref 26–34)
MCHC RBC AUTO-ENTMCNC: 32.7 G/DL (ref 32–36)
MCV RBC AUTO: 92 FL (ref 80–100)
NRBC BLD-RTO: 0 /100 WBCS (ref 0–0)
PATH REPORT.FINAL DX SPEC: NORMAL
PATH REPORT.GROSS SPEC: NORMAL
PATH REPORT.RELEVANT HX SPEC: NORMAL
PATH REPORT.TOTAL CANCER: NORMAL
PHOSPHATE SERPL-MCNC: 5.9 MG/DL (ref 2.5–4.9)
PLATELET # BLD AUTO: 263 X10*3/UL (ref 150–450)
POTASSIUM SERPL-SCNC: 5.5 MMOL/L (ref 3.5–5.3)
RBC # BLD AUTO: 4.64 X10*6/UL (ref 4.5–5.9)
SODIUM SERPL-SCNC: 129 MMOL/L (ref 136–145)
WBC # BLD AUTO: 9 X10*3/UL (ref 4.4–11.3)

## 2024-01-17 PROCEDURE — 2500000002 HC RX 250 W HCPCS SELF ADMINISTERED DRUGS (ALT 637 FOR MEDICARE OP, ALT 636 FOR OP/ED): Performed by: INTERNAL MEDICINE

## 2024-01-17 PROCEDURE — 2500000004 HC RX 250 GENERAL PHARMACY W/ HCPCS (ALT 636 FOR OP/ED): Performed by: STUDENT IN AN ORGANIZED HEALTH CARE EDUCATION/TRAINING PROGRAM

## 2024-01-17 PROCEDURE — 80069 RENAL FUNCTION PANEL: CPT | Performed by: INTERNAL MEDICINE

## 2024-01-17 PROCEDURE — 2500000004 HC RX 250 GENERAL PHARMACY W/ HCPCS (ALT 636 FOR OP/ED): Mod: JZ | Performed by: INTERNAL MEDICINE

## 2024-01-17 PROCEDURE — 99239 HOSP IP/OBS DSCHRG MGMT >30: CPT

## 2024-01-17 PROCEDURE — 2500000001 HC RX 250 WO HCPCS SELF ADMINISTERED DRUGS (ALT 637 FOR MEDICARE OP): Performed by: INTERNAL MEDICINE

## 2024-01-17 PROCEDURE — 2500000004 HC RX 250 GENERAL PHARMACY W/ HCPCS (ALT 636 FOR OP/ED): Performed by: PHYSICIAN ASSISTANT

## 2024-01-17 PROCEDURE — 36415 COLL VENOUS BLD VENIPUNCTURE: CPT

## 2024-01-17 PROCEDURE — 82947 ASSAY GLUCOSE BLOOD QUANT: CPT

## 2024-01-17 PROCEDURE — 85027 COMPLETE CBC AUTOMATED: CPT

## 2024-01-17 PROCEDURE — P9047 ALBUMIN (HUMAN), 25%, 50ML: HCPCS | Mod: JZ | Performed by: INTERNAL MEDICINE

## 2024-01-17 PROCEDURE — 8010000001 HC DIALYSIS - HEMODIALYSIS PER DAY

## 2024-01-17 RX ORDER — HEPARIN SODIUM 1000 [USP'U]/ML
40 INJECTION, SOLUTION INTRAVENOUS; SUBCUTANEOUS
Status: DISCONTINUED | OUTPATIENT
Start: 2024-01-17 | End: 2024-01-17

## 2024-01-17 RX ORDER — PANTOPRAZOLE SODIUM 40 MG/1
40 TABLET, DELAYED RELEASE ORAL
Qty: 60 TABLET | Refills: 0 | Status: SHIPPED | OUTPATIENT
Start: 2024-01-17 | End: 2024-02-16

## 2024-01-17 RX ORDER — ALBUMIN HUMAN 250 G/1000ML
12.5 SOLUTION INTRAVENOUS AS NEEDED
Status: DISCONTINUED | OUTPATIENT
Start: 2024-01-17 | End: 2024-01-17 | Stop reason: HOSPADM

## 2024-01-17 RX ORDER — PROCHLORPERAZINE MALEATE 10 MG
10 TABLET ORAL EVERY 6 HOURS PRN
Qty: 30 TABLET | Refills: 0 | Status: SHIPPED | OUTPATIENT
Start: 2024-01-17 | End: 2024-02-16

## 2024-01-17 RX ORDER — HEPARIN SODIUM 1000 [USP'U]/ML
20 INJECTION, SOLUTION INTRAVENOUS; SUBCUTANEOUS
Status: DISCONTINUED | OUTPATIENT
Start: 2024-01-18 | End: 2024-01-17 | Stop reason: HOSPADM

## 2024-01-17 RX ORDER — HEPARIN SODIUM 1000 [USP'U]/ML
40 INJECTION, SOLUTION INTRAVENOUS; SUBCUTANEOUS ONCE
Status: DISCONTINUED | OUTPATIENT
Start: 2024-01-17 | End: 2024-01-17 | Stop reason: HOSPADM

## 2024-01-17 RX ADMIN — LEVOTHYROXINE SODIUM 175 MCG: 175 TABLET ORAL at 08:41

## 2024-01-17 RX ADMIN — ALBUMIN HUMAN 12.5 G: 0.25 SOLUTION INTRAVENOUS at 12:15

## 2024-01-17 RX ADMIN — PROPRANOLOL HYDROCHLORIDE 160 MG: 80 CAPSULE, EXTENDED RELEASE ORAL at 08:42

## 2024-01-17 RX ADMIN — METOCLOPRAMIDE 5 MG: 10 TABLET ORAL at 08:42

## 2024-01-17 RX ADMIN — DICYCLOMINE HYDROCHLORIDE 10 MG: 10 CAPSULE ORAL at 08:42

## 2024-01-17 RX ADMIN — FUROSEMIDE 40 MG: 40 TABLET ORAL at 08:41

## 2024-01-17 RX ADMIN — PANTOPRAZOLE SODIUM 40 MG: 40 TABLET, DELAYED RELEASE ORAL at 08:41

## 2024-01-17 RX ADMIN — AMLODIPINE BESYLATE 10 MG: 10 TABLET ORAL at 08:41

## 2024-01-17 RX ADMIN — PROCHLORPERAZINE MALEATE 10 MG: 10 TABLET ORAL at 10:37

## 2024-01-17 RX ADMIN — HEPARIN SODIUM 2000 UNITS: 1000 INJECTION INTRAVENOUS; SUBCUTANEOUS at 12:00

## 2024-01-17 RX ADMIN — HEPARIN SODIUM 4000 UNITS: 1000 INJECTION INTRAVENOUS; SUBCUTANEOUS at 11:39

## 2024-01-17 RX ADMIN — INSULIN LISPRO 4 UNITS: 100 INJECTION, SOLUTION INTRAVENOUS; SUBCUTANEOUS at 08:45

## 2024-01-17 ASSESSMENT — COGNITIVE AND FUNCTIONAL STATUS - GENERAL
MOBILITY SCORE: 24
DAILY ACTIVITIY SCORE: 24

## 2024-01-17 ASSESSMENT — PAIN SCALES - GENERAL
PAINLEVEL_OUTOF10: 0 - NO PAIN
PAINLEVEL_OUTOF10: 0 - NO PAIN

## 2024-01-17 ASSESSMENT — PAIN - FUNCTIONAL ASSESSMENT
PAIN_FUNCTIONAL_ASSESSMENT: NO/DENIES PAIN
PAIN_FUNCTIONAL_ASSESSMENT: NO/DENIES PAIN

## 2024-01-17 NOTE — PRE-PROCEDURE NOTE
Report from Sending RN:    Report From:  Siva Geiger  Recent Surgery of Procedure: No  Baseline Level of Consciousness (LOC): Awake a/ox 3 no s/s of distress pt stable  Oxygen Use: No  Type: RA  Diabetic: Yes, 205  Last BP Med Given Day of Dialysis: see EMR  Last Pain Med Given: see EMR  Lab Tests to be Obtained with Dialysis: No  Blood Transfusion to be Given During Dialysis: No  Available IV Access: Yes  Medications to be Administered During Dialysis: No  Continuous IV Infusion Running: No  Restraints on Currently or in the Last 24 Hours: No  Hand-Off Communication: Patient awake no s/s of distress no pain n&v last night Fullcode no procedures for today  K+ 5.5

## 2024-01-17 NOTE — DISCHARGE SUMMARY
Discharge Diagnosis  Nausea & vomiting    Issues Requiring Follow-Up  Started on 2 new medications; Compazine for nausea and pantoprazole 40 mg twice daily   Will need follow up with GI, please call [phone] to make an apt or follow up with primary GI provider for path reports from EGD    Discharge Meds     Your medication list        ASK your doctor about these medications        Instructions Last Dose Given Next Dose Due   amLODIPine 10 mg tablet  Commonly known as: Norvasc           atorvastatin 10 mg tablet  Commonly known as: Lipitor           cetirizine 5 mg tablet  Commonly known as: ZyrTEC           cholecalciferol 50 MCG (2000 UT) tablet  Commonly known as: Vitamin D-3           famotidine 20 mg tablet  Commonly known as: Pepcid           furosemide 40 mg tablet  Commonly known as: Lasix           hydrOXYzine pamoate 50 mg capsule  Commonly known as: Vistaril           insulin glargine-yfgn 100 unit/mL (3 mL) Pen           LORazepam 0.5 mg tablet  Commonly known as: Ativan           Melatin 3 mg tablet  Generic drug: melatonin           mirtazapine 15 mg tablet  Commonly known as: Remeron           NovoLOG Flexpen U-100 Insulin 100 unit/mL (3 mL) pen  Generic drug: insulin aspart           polyethylene glycol 17 gram packet  Commonly known as: Glycolax, Miralax           propranolol  mg 24 hr capsule  Commonly known as: Inderal LA           Inna-Harsh 0.8 mg tablet  Generic drug: B complex-vitamin C-folic acid           rOPINIRole 0.5 mg tablet  Commonly known as: Requip           sennosides-docusate sodium 8.6-50 mg tablet  Commonly known as: Dinah-Colace           SENSIPAR ORAL           sertraline 100 mg tablet  Commonly known as: Zoloft           Synthroid 175 mcg tablet  Generic drug: levothyroxine           Velphoro 500 mg tablet,chewable chewable tablet  Generic drug: sucroferric oxyhydroxide                    Test Results Pending At Discharge  Pending Labs       Order Current Status     Extra Urine Gray Tube Collected (01/12/24 0348)    Surgical Pathology Exam In process    Urinalysis with Reflex Culture and Microscopic In process            Hospital Course  A 53 year old  male presenting with a pmhx with  ESRD on hemodialysis, DM, HTN, HLD, thyroid cancer status post resection who presented for nausea and vomiting.  Of note, patient states on Monday, he was not feeling well therefore he missed his dialysis session on 1/8/23. Patient does endorse history of cyclic vomiting syndrome. Patient was admitted to the hospital for tractable nausea and vomiting and was discharged on 1/10/23, however returned 2 days because patient felt too nauseous and reported retching and abdominal pain after vomiting with heartburn.   Denies any fever, chills, shortness of breath, chest pain, diarrhea. On arrival to the ED vital signs are stable, labs reviewed and showed alkalemia, creatinine 6.6 patient on dialysis, lactic acid 5.5 received 1 L of fluid in the ED came down to 1.7, lipase normal at 53, mild leukocytosis 11.4, COVID and flu was negative.  Admitted for intractable nausea and vomiting, unable to tolerate oral intake. GI was consulted given persistent symptoms. EBD done on 1/15/23 revealed edematous and erythematous mucosa in the duodenal bulb with Small 5mm ulceration. Patient has listed allergy of omeprazole which causes nausea and vomiting., thus patient was started on pantoprazole and tolerated well. Patient will be discharged on Pantoprazole 40 mg twice daily and Compazine for nausea. Will need follow up with GI either with primary GI/Dr. Back for path reports from EGD. Of note, nephrology consulted during hospital stay for dialysis. On 1/17/23; patient did have a rapid response. Patient was hypotensive while receiving dialysis. Patient's BP was stable in 130's systolically during most of HD session; patient was asymptomatic and blood pressure did improved with adjusting the rate. Patient after HD,  did feel better. Patient vitally stable on the day of discharge.     Pertinent Physical Exam At Time of Discharge  Physical Exam  General: well appearing, no acute distress  HEENT:  moist mucous membranes  CV: regular rate and rhythm, no murmurs, 2+ pulses in all extremities  RESP: CTA bilaterally, normal chest expansion, no resp distress  Abd: soft, nontender, nondistended  Neuro: alert and oriented x3, speech appropriate  Vascular: no peripheral edema appreciated  Skin: no rashes  MSK: no joint swelling    Outpatient Follow-Up  No future appointments.      Naima Rader DO  Case and plan discussed with attending

## 2024-01-17 NOTE — CARE PLAN
The patient's goals for the shift include remain fall free    The clinical goals for the shift include remain afebrile

## 2024-01-17 NOTE — NURSING NOTE
"Arrived to bedside for ultrasound IV insertion to find rapid response ending. Patient was hypotensive while receiving dialysis, which was still active upon arrival to room. Patient's BP was stable in 130's systolically upon arrival, patient was asymptomatic and receiving albumin. Patient stated that he did not want an IV placed because \"I don't need it, I'm fine, I always get hypotensive with dialysis, I do not have any symptoms\". Dialysis Technician stated Patient was fine with no symptoms. Education completed, patient verbalized understanding and restated that he did not want an IV placed at this time. YEIMI Paniagua notified.   "

## 2024-01-17 NOTE — PROGRESS NOTES
Renal Progress Note  Assessment:  53 y.o. male with history s/f ESRD on IHD, HTN, T2DM, recurrent N/V, remote h/o thyroid cancer who was recently discharged after being managed for N/V and presents hours later for the same.     ESRD on IHD MWF via [facility], pt of Dr. Aleman, EDW 96 kg   Hypertensive urgency: improved  Recurrent N/V  Hypokalemia   Hypercalcemia   Lactic acidosis: improved   Anemia of renal disease  Secondary renal hyperparathyroidism     Plan:  - seen that being tried on double dose PPI and compazine  - will hold sensipar  - hd MWF  - ok for d/c    Subjective:   Admit Date: 1/12/2024    Interval History: pt had hd.  discharge today.  No new events      Medications:   Scheduled Meds:alum-mag hydroxide-simeth, 10 mL, oral, q6h  amLODIPine, 10 mg, oral, Daily  atorvastatin, 10 mg, oral, Every Mon/Wed/Fri  cholecalciferol, 2,000 Units, oral, Daily  dicyclomine, 10 mg, oral, 4x daily  famotidine, 20 mg, oral, Every Mon/Wed/Fri  furosemide, 40 mg, oral, Daily  [START ON 1/18/2024] heparin, 20 Units/kg, intravenous, After Dialysis  heparin, 2,000 Units, intravenous, After Dialysis  heparin, 40 Units/kg, intravenous, Once  heparin, 4,000 Units, intravenous, After Dialysis  insulin lispro, 0-10 Units, subcutaneous, Before meals & nightly  levothyroxine, 175 mcg, oral, Daily  lidocaine, 5 mL, Mouth/Throat, q6h  loratadine, 10 mg, oral, Daily  melatonin, 6 mg, oral, Nightly  metoclopramide, 5 mg, oral, Before meals & nightly  mirtazapine, 7.5 mg, oral, Nightly  pantoprazole, 40 mg, oral, BID AC  polyethylene glycol, 17 g, oral, Daily  propranolol LA, 160 mg, oral, Daily  rOPINIRole, 0.5 mg, oral, Nightly  sennosides-docusate sodium, 2 tablet, oral, Daily  sertraline, 150 mg, oral, Daily  sucralfate, 1 g, oral, q6h REBEL      Continuous Infusions:     CBC:   Lab Results   Component Value Date    HGB 14.0 01/17/2024    HGB 13.4 (L) 01/15/2024    WBC 9.0 01/17/2024    WBC 9.2 01/15/2024     01/17/2024    PLT  "224 01/15/2024      Anemia:  No results found for: \"FERRITIN\", \"IRON\", \"TIBC\"   BMP:    Lab Results   Component Value Date     (L) 01/17/2024     (L) 01/16/2024    K 5.5 (H) 01/17/2024    K 3.9 01/16/2024    CL 92 (L) 01/17/2024    CL 94 (L) 01/16/2024    CO2 22 01/17/2024    CO2 26 01/16/2024    BUN 37 (H) 01/17/2024    BUN 26 (H) 01/16/2024    CREATININE 7.54 (H) 01/17/2024    CREATININE 6.03 (H) 01/16/2024      Bone disease:   Lab Results   Component Value Date    PHOS 5.9 (H) 01/17/2024      Urinalysis:    No results found for: \"STONE\", \"PROTUR\", \"GLUCOSEU\", \"BLOODU\", \"KETONESU\", \"BILIRUBINU\", \"NITRITEU\", \"LEUKOCYTESU\", \"UTPCR\"       Objective:   Vitals: /50   Pulse 62   Temp 36.3 °C (97.3 °F) (Temporal)   Resp 18   Ht 1.778 m (5' 10\")   Wt 99.3 kg (218 lb 14.7 oz)   SpO2 99%   BMI 31.41 kg/m²    Wt Readings from Last 3 Encounters:   01/13/24 99.3 kg (218 lb 14.7 oz)   01/10/24 96 kg (211 lb 10.3 oz)   06/14/21 91.8 kg (202 lb 5 oz)      24HR INTAKE/OUTPUT:    Intake/Output Summary (Last 24 hours) at 1/17/2024 1706  Last data filed at 1/17/2024 1600  Gross per 24 hour   Intake 2450 ml   Output 0 ml   Net 2450 ml       Admission weight:  Weight: 94.3 kg (208 lb 0.1 oz)      Constitutional:  Alert, awake, no apparent distress   Skin:normal, no rash  HEENT:sclera anicteric.  Head atraumatic normocephalic  Neck:supple with no thyromegally  Cardiovascular:  S1, S2 without m/r/g   Respiratory:  CTA B without w/r/r   Abdomen: +bs, soft, nt  Ext: no LE edema  Musculoskeletal:Intact  Neuro:Alert and oriented with no deficit      Electronically signed by Jaylan Aleman MD on 1/17/2024 at 5:06 PM            "

## 2024-01-17 NOTE — DISCHARGE INSTRUCTIONS
Started on 2 new medications; Compazine for nausea and pantoprazole 40 mg twice daily   Will need follow up with GI, please call [phone] to make an apt or follow up with primary GI provider for path reports from EGD

## 2024-01-17 NOTE — CARE PLAN
The patient's goals for the shift include      The clinical goals for the shift include no nausea      Problem: Diabetes  Goal: Maintain electrolyte levels within acceptable range throughout shift  Outcome: Progressing  Goal: Maintain glucose levels >70mg/dl to <250mg/dl throughout shift  Outcome: Progressing     Problem: Pain - Adult  Goal: Verbalizes/displays adequate comfort level or baseline comfort level  Outcome: Progressing     Problem: Safety - Adult  Goal: Free from fall injury  Outcome: Progressing     Problem: Discharge Planning  Goal: Discharge to home or other facility with appropriate resources  Outcome: Progressing     Problem: Chronic Conditions and Co-morbidities  Goal: Patient's chronic conditions and co-morbidity symptoms are monitored and maintained or improved  Outcome: Progressing     Problem: ESRD: Dialysis, CAPD  Goal: Electrolytes restored to baseline  Outcome: Progressing  Goal: Fatigue of chronic illness managed  Outcome: Progressing  Goal: Follows dialysis treatment plan  Outcome: Progressing

## 2024-01-17 NOTE — PRE-PROCEDURE NOTE
Report to Receiving RN:    Report To: Siva Geiger  Time Report Called: Report given t 14:10 in person  Hand-Off Communication: Pt awake no s/s of distress Bp within limits l118/59 HR 62 fluid removed = 0.8L,   12:30 RR team called due to hypotension.    RN notified of low BP around 11:48a.m. and was asked for Albumin.  RN ordered it to Pharmacy.  RN came with Albumin but according to her she needs to give it thru IV access, pt doesn't have available IV access to connect Albumin IV line.  I mentioned that she can put it on dialysis machine but refused instead went out and talked to .    RR Team asked if 500cc NS given to the patient, 600 NSF already given to the patient.  Pt awake a/o x 3 aware as to what is going.    RN tried to put an IV access on the patient but unsuccessful.  Another RN gave albumin thru Dialysis machine.      Radiology RN came to try to put an IV access but unsuccessful, pt refused any IV access and stated he doesn't need it.  Complications During Treatment: Yes,  hypotensive  Ultrafiltration Treatment: No  Medications Administered During Dialysis: Yes, Albumin   Blood Products Administered During Dialysis: No  Labs Sent During Dialysis: No  Heparin Drip Rate Changes: No    Electronic Signatures:  Valeria Gaytan        Last Updated: 4:46 PM by VALERIA GAYTAN

## 2024-01-26 NOTE — DOCUMENTATION CLARIFICATION NOTE
"    PATIENT:               CHERYL QUINONES  ACCT #:                  [id number]  MRN:                       [id number]  :                       1970  ADMIT DATE:       2024 12:34 AM  DISCH DATE:        2024 5:05 PM  RESPONDING PROVIDER #:        [id number]          PROVIDER RESPONSE TEXT:    nausea and vomiting due to DM gastroparesis    CDI QUERY TEXT:    [facility]_Etiology Linkage    Instruction:    Based on your assessment of the patient and the clinical information, please provide the requested documentation by clicking on the appropriate radio button and enter any additional information if prompted.    Question: Please clarify if a relationship exists between    When answering this query, please exercise your independent professional judgment. The fact that a question is being asked, does not imply that any particular answer is desired or expected.    The patient's clinical indicators include:  Clinical Information: Admit with nausea and vomitting    Clinical Indicators:    DC summary states \"Admitted for intractable nausea and vomiting, unable to tolerate oral intake.GI was consulted given persistent symptoms. EBD done on 1/15/23 revealed edematous and erythematous mucosa in the duodenal bulb with Small 5mm ulceration.Patient has listed allergy of omeprazole which causes nausea and vomiting., thus patient was started on pantoprazole and tolerated well.Patient will be discharged on Pantoprazole 40 mg twice daily and Compazine for nausea.Will need follow up with GI either with primary GI/ for path reports from EGD.\"    GI note 24 states \"diabetes\" and \"Acute on chronic nausea and vomiting -could be component of gastroparesis given elevated blood sugar and HA1C 9.3 in September.History of pyloric stenosis. Acid reflux -currently not controlled with famotidine\"    Path report from 1/15/24 at 0917 resulted in \"REACTIVE GASTROPATHY, SEE NOTE. Note: No microorganisms are identified.\"    Treatment: " Pantoprazole 40 mg twice daily and Compazine, egd with biopsy, gi consult    Risk Factors: n/v in setting of erythematous duodenal bulb, gastropathy per biopsy, acid reflux  Options provided:  -- nausea and vomiting due to erythematous gastropathy  -- nausea and vomiting due to DM gastroparesis  -- nausea and vomiting due to gerd  -- Other - I will add my own diagnosis  -- Refer to Clinical Documentation Reviewer    Query created by: Francisco Whitten on 1/23/2024 1:50 PM      Electronically signed by:  TEAGAN CORCOARN MD 1/26/2024 7:38 AM

## 2024-02-05 ENCOUNTER — TELEPHONE (OUTPATIENT)
Dept: TRANSPLANT | Facility: HOSPITAL | Age: 54
End: 2024-02-05
Payer: OTHER GOVERNMENT

## 2024-02-08 ENCOUNTER — TELEPHONE (OUTPATIENT)
Dept: TRANSPLANT | Facility: HOSPITAL | Age: 54
End: 2024-02-08
Payer: OTHER GOVERNMENT

## 2024-02-12 LAB
ATRIAL RATE: 76 BPM
P AXIS: 15 DEGREES
P OFFSET: 204 MS
P ONSET: 145 MS
PR INTERVAL: 142 MS
Q ONSET: 216 MS
QRS COUNT: 13 BEATS
QRS DURATION: 86 MS
QT INTERVAL: 406 MS
QTC CALCULATION(BAZETT): 456 MS
QTC FREDERICIA: 439 MS
R AXIS: -2 DEGREES
T AXIS: 28 DEGREES
T OFFSET: 419 MS
VENTRICULAR RATE: 76 BPM

## 2024-02-21 ENCOUNTER — SOCIAL WORK (OUTPATIENT)
Dept: TRANSPLANT | Facility: HOSPITAL | Age: 54
End: 2024-02-21
Payer: OTHER GOVERNMENT

## 2024-02-21 NOTE — PROGRESS NOTES
HEAVEN met with Pt, Pt's dad Oswaldo, and Pt's step-mom Clementine to confirm primary support. Pt was pleasant and engaged. Pt previously listed his son, Duane as primary support but switched his primary support to his dad, Oswaldo due to Pt's son not being present for follow-up visit. Pt reported his son could be listed as his secondary support. Pt's dad reported he lives local to Pt, Pt could stay with him post-transplant, and he currently is working part-time. Pt's step-mom reported that she and Pt's dad will both be retiring on May 31st. Pt's dad's phone number is [phone]. Pt's step mom's phone number is [phone]. Pt's son's phone number is [phone]. Pt shared all of his supports drive and have working vehicles. Pt, Pt's dad, and SW all signed TCA. Pt denied any further questions/concerns at this time.     HEAVEN attempted to reach Pt's secondary support, Duane via telephone call to confirm commitment. HEAVEN was unable to speak with Pt's secondary support and left  with SW contact information.    Plan: HEAVEN will await return phone call from Pt's secondary support.

## 2024-02-27 ENCOUNTER — DOCUMENTATION (OUTPATIENT)
Dept: TRANSPLANT | Facility: HOSPITAL | Age: 54
End: 2024-02-27
Payer: OTHER GOVERNMENT

## 2024-02-27 NOTE — PROGRESS NOTES
SW received VM from Pt's son, Duane requesting a return call. SW attempted to return Pt's son's phone call and LVM requesting a return call. SW provided SW contact information.     Plan: SW will await return phone call.

## 2024-02-29 ENCOUNTER — DOCUMENTATION (OUTPATIENT)
Dept: TRANSPLANT | Facility: HOSPITAL | Age: 54
End: 2024-02-29
Payer: OTHER GOVERNMENT

## 2024-02-29 NOTE — PROGRESS NOTES
HEAVEN spoke with Pt's secondary support, Duane via telephone call. HEAVEN confirmed Duane's identity. Duane shared with HEAVEN that Pt is scheduled for a living donor surgery at the end of April through the [facility]. HEAVEN relayed information to Pt's coordinator. HEAVEN still verified role of support with Duane. Duane reported that he works full-time, but will have access to [place] and can take time off post-transplant. Duane shared that he has 3 children under the age of 3 and his wife can care for their children. Duane reported that he lives local to Pt and drives and has a working vehicle. Duane confirmed that he is willing and able to care for Pt throughout the transplant process. Duane denied any further questions/concerns at this time.     Plan: HEAVEN will remain available.

## 2024-03-06 ENCOUNTER — TELEPHONE (OUTPATIENT)
Dept: TRANSPLANT | Facility: HOSPITAL | Age: 54
End: 2024-03-06
Payer: OTHER GOVERNMENT

## 2024-03-28 ENCOUNTER — DOCUMENTATION (OUTPATIENT)
Dept: TRANSPLANT | Facility: HOSPITAL | Age: 54
End: 2024-03-28
Payer: OTHER GOVERNMENT

## 2024-04-01 ENCOUNTER — DOCUMENTATION (OUTPATIENT)
Dept: TRANSPLANT | Facility: HOSPITAL | Age: 54
End: 2024-04-01
Payer: OTHER GOVERNMENT

## 2024-04-01 ENCOUNTER — COMMITTEE REVIEW (OUTPATIENT)
Dept: TRANSPLANT | Facility: HOSPITAL | Age: 54
End: 2024-04-01
Payer: OTHER GOVERNMENT

## 2024-04-01 NOTE — COMMITTEE REVIEW
Presentation for Listing Evaluation Date: 9/12/2023  Committee Review Date: 3/28/2024    Organ being evaluated for: Kidney    Transplant Phase: Evaluation  Transplant Status: Active    Referring Physician:     Primary Diagnosis:   Secondary Diagnosis:     Committee Review Decision: Approved      Committee Discussion Details: Patient is listed and waiting to [facility] [place]. Will list patient status 7 here as back up in case plans change.  The candidate's evaluation was presented and discussed at the Kidney  Multidisciplinary Selection Conference. After review of the candidate's diagnosis and the evaluations of the multidisciplinary team members, it was the consensus of the Selection Committee that the candidate does meet Kidney Selection Criteria and is Approved for Kidney transplant.    Physician: No concerns  Surgeon: No concerns  Social Work: No concerns  Dietician: No concerns  Pharmacist: No concerns  Transplant Coordinator: No concerns

## 2024-04-01 NOTE — LETTER
April 1, 2024    Duane Adams  [address] [address]      Dear Mr. Adams:    Our multi-disciplinary transplant team completed a review of your medical records on 3/28/2024.  I am pleased to inform you that you will be placed on the [facility] ([facility]) waiting list for a Kidney transplant.    Our transplant program consists of surgeons and medical doctors who provide coverage 365 days a year, 24 hours a day.     If you have any questions or concerns regarding your insurance coverage or billing issues, a  is available to speak with you.     It is important to keep us updated of any major changes in your medical condition, contact information and health insurance coverage.     Please don't hesitate to contact us at Dept: [phone] with any questions or concerns. We look forward to working with you through this process.      Sincerely,      Keira Ogden RN          The [facility] Toll-free Patient Services Line:  Your Resource for Organ Transplant Information    If you have a question regarding your own medical care, you always should call your transplant hospital first. However, for general organ transplant-related information, you should call the [facility] ([facility]) toll-free patient services line at 1-612.148.3645.  Anyone, including potential transplant candidates, candidates, recipients, family members, friends, living donors, and donor family members, can call this number to:    Talk about organ donation, living donation, the transplant process, the donation process, and transplant policies.  Get a free patient information kit with helpful booklets, waiting list and transplant information, and a list of all transplant hospitals.  Ask questions about the Organ Procurement and Transplantation Network (OPTN) web site (http://optn.transplant.hrsa.gov/), the [facility]OS Web site (http://unos.org/), or the UNOS web site for living donors and  transplant recipients. (http://www.transplantliving.org/).  Learn how [facility] and the OPTN can help you.  Talk about any concerns that you may have with a transplant hospital.    [facility] is a not-for-profit organization that provides the administrative services for the national OPTN under federal contract to the Health Resources and Services Administration (HRSA), an agency under the U.S. Department of Health and Human Services (HHS).    [facility] and the OPTN are responsible for:    Providing educational material for patients, the public, and professionals.  Raising awareness of the need for donated organs and tissue.  Writing organ transplant policy with help from transplant professionals, transplant patients, transplant candidates, donor families, living donors, and the public.  Coordinating organ procurement, matching, and placement.  Collecting information about every organ transplant and donation that occurs in the [place].    Remember, you should contact your transplant hospital directly if you have questions or concerns about your own medical care including medical records, work-up progress, and test results.    [facility] is not your transplant hospital, and staff at [facility] will not be able to transfer you to your transplant hospital, so keep your transplant hospital’s phone number handy.    However, while you research your transplant needs and learn as much as you can about transplantation and donation, we welcome your call to our toll-free patient services line at [phone].      [facility] PIL Final Rev 1-

## 2024-04-05 ENCOUNTER — TELEPHONE (OUTPATIENT)
Dept: TRANSPLANT | Facility: HOSPITAL | Age: 54
End: 2024-04-05
Payer: OTHER GOVERNMENT

## 2024-04-05 NOTE — TELEPHONE ENCOUNTER
I called the patient with Dr. Flores to get listing consent signed.  Patient reviewed listing consent with Dr. Flores and verbally agreed to the consent.  Patient is aware we are listing him status 7 as a back up to his living donor transplant taking place and [facility] on 4/18.  Patient denied any further questions.

## 2024-04-11 ENCOUNTER — DOCUMENTATION (OUTPATIENT)
Dept: TRANSPLANT | Facility: HOSPITAL | Age: 54
End: 2024-04-11
Payer: OTHER GOVERNMENT

## 2024-04-12 ENCOUNTER — DOCUMENTATION (OUTPATIENT)
Dept: TRANSPLANT | Facility: HOSPITAL | Age: 54
End: 2024-04-12
Payer: OTHER GOVERNMENT

## 2024-04-19 ENCOUNTER — DOCUMENTATION (OUTPATIENT)
Dept: TRANSPLANT | Facility: HOSPITAL | Age: 54
End: 2024-04-19
Payer: OTHER GOVERNMENT

## 2024-04-19 NOTE — PROGRESS NOTES
Pt was transplanted living donor at [facility].  Delisted in [facility] and [facility].

## 2024-04-26 ENCOUNTER — DOCUMENTATION (OUTPATIENT)
Dept: TRANSPLANT | Facility: HOSPITAL | Age: 54
End: 2024-04-26
Payer: OTHER GOVERNMENT

## 2024-04-26 ENCOUNTER — COMMITTEE REVIEW (OUTPATIENT)
Dept: TRANSPLANT | Facility: HOSPITAL | Age: 54
End: 2024-04-26
Payer: OTHER GOVERNMENT

## 2024-04-26 NOTE — LETTER
April 26, 2024    Duane Adams  [address] [facility] [address]      Dear Mr. Adams:    Our multi-disciplinary transplant team completed a review of your medical records on 3/28/2024.  I am pleased to inform you that you will be placed on the [facility] ([facility]) waiting list for a Kidney transplant.    Our transplant program consists of surgeons and medical doctors who provide coverage 365 days a year, 24 hours a day.     If you have any questions or concerns regarding your insurance coverage or billing issues, a  is available to speak with you.     It is important to keep us updated of any major changes in your medical condition, contact information and health insurance coverage.     Please don't hesitate to contact us at Dept: [phone] with any questions or concerns. We look forward to working with you through this process.      Sincerely,      Keira Ogden RN          The [facility] Toll-free Patient Services Line:  Your Resource for Organ Transplant Information    If you have a question regarding your own medical care, you always should call your transplant hospital first. However, for general organ transplant-related information, you should call the [facility] ([facility]) toll-free patient services line at [phone].  Anyone, including potential transplant candidates, candidates, recipients, family members, friends, living donors, and donor family members, can call this number to:    Talk about organ donation, living donation, the transplant process, the donation process, and transplant policies.  Get a free patient information kit with helpful booklets, waiting list and transplant information, and a list of all transplant hospitals.  Ask questions about the Organ Procurement and Transplantation Network (OPTN) web site (http://optn.transplant.hrsa.gov/), the [facility] Web site (http://unos.org/), or the [facility]OS web site for living donors and  transplant recipients. (http://www.transplantliving.org/).  Learn how [facility] and the OPTN can help you.  Talk about any concerns that you may have with a transplant hospital.    [facility] is a not-for-profit organization that provides the administrative services for the national OPTN under federal contract to the Health Resources and Services Administration (HRSA), an agency under the [place]. Department of Health and Human Services (HHS).    [facility] and the OPTN are responsible for:    Providing educational material for patients, the public, and professionals.  Raising awareness of the need for donated organs and tissue.  Writing organ transplant policy with help from transplant professionals, transplant patients, transplant candidates, donor families, living donors, and the public.  Coordinating organ procurement, matching, and placement.  Collecting information about every organ transplant and donation that occurs in the [place].    Remember, you should contact your transplant hospital directly if you have questions or concerns about your own medical care including medical records, work-up progress, and test results.    [facility] is not your transplant hospital, and staff at [facility] will not be able to transfer you to your transplant hospital, so keep your transplant hospital’s phone number handy.    However, while you research your transplant needs and learn as much as you can about transplantation and donation, we welcome your call to our toll-free patient services line at [phone].      [facility] PIL Final Rev 1-

## 2024-04-26 NOTE — COMMITTEE REVIEW
Presentation for Listing Evaluation Date: 12/8/2020  Committee Review Date: 4/28/2024    Organ being evaluated for: Kidney    Transplant Phase: Evaluation  Transplant Status: Declined    Referring Physician:      Primary Diagnosis:   Secondary Diagnosis:     Committee Review Decision: Declined      Committee Discussion Details: Patient evaluation will be closed and patient has been de listed because he was transplanted at another center.  The candidate's evaluation was presented and discussed at the Kidney  Multidisciplinary Selection Conference. After review of the candidate's diagnosis and the evaluations of the multidisciplinary team members, it was the consensus of the Selection Committee that the candidate does not meet Kidney Selection Criteria and is Approved for Kidney transplant.    Physician: No concerns  Surgeon: No concerns  Social Work: No concerns  Dietician: No concerns  Pharmacist: No concerns  Transplant Coordinator: No concerns

## 2024-09-18 ENCOUNTER — TELEPHONE (OUTPATIENT)
Dept: TRANSPLANT | Facility: HOSPITAL | Age: 54
End: 2024-09-18